# Patient Record
Sex: FEMALE | Race: WHITE | NOT HISPANIC OR LATINO | Employment: PART TIME | URBAN - METROPOLITAN AREA
[De-identification: names, ages, dates, MRNs, and addresses within clinical notes are randomized per-mention and may not be internally consistent; named-entity substitution may affect disease eponyms.]

---

## 2017-08-04 ENCOUNTER — OFFICE VISIT - HEALTHEAST (OUTPATIENT)
Dept: FAMILY MEDICINE | Facility: CLINIC | Age: 22
End: 2017-08-04

## 2017-08-04 DIAGNOSIS — H66.92 LEFT OTITIS MEDIA: ICD-10-CM

## 2017-08-04 DIAGNOSIS — H60.93 OTITIS EXTERNA OF BOTH EARS: ICD-10-CM

## 2017-12-07 ENCOUNTER — OFFICE VISIT - HEALTHEAST (OUTPATIENT)
Dept: PEDIATRICS | Facility: CLINIC | Age: 22
End: 2017-12-07

## 2017-12-07 DIAGNOSIS — Z00.01 ENCOUNTER FOR ROUTINE ADULT HEALTH EXAMINATION WITH ABNORMAL FINDINGS: ICD-10-CM

## 2017-12-07 DIAGNOSIS — Z51.81 MEDICATION MONITORING ENCOUNTER: ICD-10-CM

## 2017-12-07 ASSESSMENT — MIFFLIN-ST. JEOR: SCORE: 1168.42

## 2018-01-05 ENCOUNTER — RECORDS - HEALTHEAST (OUTPATIENT)
Dept: ADMINISTRATIVE | Facility: OTHER | Age: 23
End: 2018-01-05

## 2018-01-11 ENCOUNTER — RECORDS - HEALTHEAST (OUTPATIENT)
Dept: ADMINISTRATIVE | Facility: OTHER | Age: 23
End: 2018-01-11

## 2018-05-24 ENCOUNTER — RECORDS - HEALTHEAST (OUTPATIENT)
Dept: ADMINISTRATIVE | Facility: OTHER | Age: 23
End: 2018-05-24

## 2018-07-19 ENCOUNTER — OFFICE VISIT - HEALTHEAST (OUTPATIENT)
Dept: PEDIATRICS | Facility: CLINIC | Age: 23
End: 2018-07-19

## 2018-07-19 DIAGNOSIS — H60.391 INFECTIVE OTITIS EXTERNA, RIGHT: ICD-10-CM

## 2018-08-23 ENCOUNTER — RECORDS - HEALTHEAST (OUTPATIENT)
Dept: ADMINISTRATIVE | Facility: OTHER | Age: 23
End: 2018-08-23

## 2018-12-20 ENCOUNTER — OFFICE VISIT - HEALTHEAST (OUTPATIENT)
Dept: PEDIATRICS | Facility: CLINIC | Age: 23
End: 2018-12-20

## 2018-12-20 DIAGNOSIS — R94.120 FAILED HEARING SCREENING: ICD-10-CM

## 2018-12-20 DIAGNOSIS — Z00.00 ENCOUNTER FOR ANNUAL HEALTH EXAMINATION: ICD-10-CM

## 2018-12-20 ASSESSMENT — MIFFLIN-ST. JEOR: SCORE: 1258.92

## 2019-02-21 ENCOUNTER — RECORDS - HEALTHEAST (OUTPATIENT)
Dept: ADMINISTRATIVE | Facility: OTHER | Age: 24
End: 2019-02-21

## 2019-11-26 ENCOUNTER — RECORDS - HEALTHEAST (OUTPATIENT)
Dept: ADMINISTRATIVE | Facility: OTHER | Age: 24
End: 2019-11-26

## 2020-01-06 ENCOUNTER — OFFICE VISIT - HEALTHEAST (OUTPATIENT)
Dept: FAMILY MEDICINE | Facility: CLINIC | Age: 25
End: 2020-01-06

## 2020-01-06 DIAGNOSIS — Z11.1 SCREENING EXAMINATION FOR PULMONARY TUBERCULOSIS: ICD-10-CM

## 2020-01-06 DIAGNOSIS — Z00.00 WELL FEMALE EXAM WITHOUT GYNECOLOGICAL EXAM: ICD-10-CM

## 2020-01-06 LAB
ALBUMIN SERPL-MCNC: 4.9 G/DL (ref 3.5–5)
ALBUMIN UR-MCNC: ABNORMAL MG/DL
ALP SERPL-CCNC: 67 U/L (ref 45–120)
ALT SERPL W P-5'-P-CCNC: 41 U/L (ref 0–45)
ANION GAP SERPL CALCULATED.3IONS-SCNC: 18 MMOL/L (ref 5–18)
APPEARANCE UR: CLEAR
AST SERPL W P-5'-P-CCNC: 28 U/L (ref 0–40)
BACTERIA #/AREA URNS HPF: ABNORMAL HPF
BILIRUB SERPL-MCNC: 0.4 MG/DL (ref 0–1)
BILIRUB UR QL STRIP: ABNORMAL
BUN SERPL-MCNC: 13 MG/DL (ref 8–22)
CALCIUM SERPL-MCNC: 10.1 MG/DL (ref 8.5–10.5)
CHLORIDE BLD-SCNC: 103 MMOL/L (ref 98–107)
CHOLEST SERPL-MCNC: 163 MG/DL
CO2 SERPL-SCNC: 20 MMOL/L (ref 22–31)
COLOR UR AUTO: YELLOW
CREAT SERPL-MCNC: 0.79 MG/DL (ref 0.6–1.1)
ERYTHROCYTE [DISTWIDTH] IN BLOOD BY AUTOMATED COUNT: 11.4 % (ref 11–14.5)
FASTING STATUS PATIENT QL REPORTED: YES
GFR SERPL CREATININE-BSD FRML MDRD: >60 ML/MIN/1.73M2
GLUCOSE BLD-MCNC: 67 MG/DL (ref 70–125)
GLUCOSE UR STRIP-MCNC: NEGATIVE MG/DL
HBA1C MFR BLD: 4.7 % (ref 3.5–6)
HCT VFR BLD AUTO: 42.4 % (ref 35–47)
HDLC SERPL-MCNC: 47 MG/DL
HGB BLD-MCNC: 13.9 G/DL (ref 12–16)
HGB UR QL STRIP: NEGATIVE
KETONES UR STRIP-MCNC: ABNORMAL MG/DL
LDLC SERPL CALC-MCNC: 88 MG/DL
LEUKOCYTE ESTERASE UR QL STRIP: ABNORMAL
MCH RBC QN AUTO: 29.9 PG (ref 27–34)
MCHC RBC AUTO-ENTMCNC: 32.8 G/DL (ref 32–36)
MCV RBC AUTO: 91 FL (ref 80–100)
NITRATE UR QL: NEGATIVE
PH UR STRIP: 5.5 [PH] (ref 5–8)
PLATELET # BLD AUTO: 222 THOU/UL (ref 140–440)
PMV BLD AUTO: 9.1 FL (ref 7–10)
POTASSIUM BLD-SCNC: 4.8 MMOL/L (ref 3.5–5)
PROT SERPL-MCNC: 8.2 G/DL (ref 6–8)
RBC # BLD AUTO: 4.65 MILL/UL (ref 3.8–5.4)
RBC #/AREA URNS AUTO: ABNORMAL HPF
SODIUM SERPL-SCNC: 141 MMOL/L (ref 136–145)
SP GR UR STRIP: >=1.03 (ref 1–1.03)
SQUAMOUS #/AREA URNS AUTO: ABNORMAL LPF
TRIGL SERPL-MCNC: 139 MG/DL
TSH SERPL DL<=0.005 MIU/L-ACNC: 1.19 UIU/ML (ref 0.3–5)
UROBILINOGEN UR STRIP-ACNC: ABNORMAL
WBC #/AREA URNS AUTO: ABNORMAL HPF
WBC: 4.4 THOU/UL (ref 4–11)

## 2020-01-06 ASSESSMENT — MIFFLIN-ST. JEOR: SCORE: 1243.5

## 2020-01-07 ENCOUNTER — COMMUNICATION - HEALTHEAST (OUTPATIENT)
Dept: FAMILY MEDICINE | Facility: CLINIC | Age: 25
End: 2020-01-07

## 2020-01-07 LAB — BACTERIA SPEC CULT: NORMAL

## 2020-01-08 LAB
GAMMA INTERFERON BACKGROUND BLD IA-ACNC: 0.07 IU/ML
M TB IFN-G BLD-IMP: NEGATIVE
MITOGEN IGNF BCKGRD COR BLD-ACNC: 0 IU/ML
MITOGEN IGNF BCKGRD COR BLD-ACNC: 0.02 IU/ML
QTF INTERPRETATION: NORMAL
QTF MITOGEN - NIL: 1.54 IU/ML

## 2020-01-09 ENCOUNTER — COMMUNICATION - HEALTHEAST (OUTPATIENT)
Dept: FAMILY MEDICINE | Facility: CLINIC | Age: 25
End: 2020-01-09

## 2021-01-11 ENCOUNTER — OFFICE VISIT - HEALTHEAST (OUTPATIENT)
Dept: FAMILY MEDICINE | Facility: CLINIC | Age: 26
End: 2021-01-11

## 2021-01-11 DIAGNOSIS — Z00.00 ENCOUNTER FOR GENERAL ADULT MEDICAL EXAMINATION WITHOUT ABNORMAL FINDINGS: ICD-10-CM

## 2021-01-11 DIAGNOSIS — R62.0 DELAYED MILESTONES: ICD-10-CM

## 2021-01-11 DIAGNOSIS — Z12.4 ENCOUNTER FOR SCREENING FOR CERVICAL CANCER: ICD-10-CM

## 2021-01-11 LAB
ALBUMIN SERPL-MCNC: 4.4 G/DL (ref 3.5–5)
ALP SERPL-CCNC: 65 U/L (ref 45–120)
ALT SERPL W P-5'-P-CCNC: 15 U/L (ref 0–45)
ANION GAP SERPL CALCULATED.3IONS-SCNC: 11 MMOL/L (ref 5–18)
AST SERPL W P-5'-P-CCNC: 18 U/L (ref 0–40)
BASOPHILS # BLD AUTO: 0 THOU/UL (ref 0–0.2)
BASOPHILS NFR BLD AUTO: 1 % (ref 0–2)
BILIRUB SERPL-MCNC: 0.2 MG/DL (ref 0–1)
BUN SERPL-MCNC: 10 MG/DL (ref 8–22)
CALCIUM SERPL-MCNC: 9.8 MG/DL (ref 8.5–10.5)
CHLORIDE BLD-SCNC: 104 MMOL/L (ref 98–107)
CO2 SERPL-SCNC: 25 MMOL/L (ref 22–31)
CREAT SERPL-MCNC: 0.7 MG/DL (ref 0.6–1.1)
EOSINOPHIL # BLD AUTO: 0.1 THOU/UL (ref 0–0.4)
EOSINOPHIL NFR BLD AUTO: 1 % (ref 0–6)
ERYTHROCYTE [DISTWIDTH] IN BLOOD BY AUTOMATED COUNT: 12.2 % (ref 11–14.5)
GFR SERPL CREATININE-BSD FRML MDRD: >60 ML/MIN/1.73M2
GLUCOSE BLD-MCNC: 85 MG/DL (ref 70–125)
HCT VFR BLD AUTO: 40.5 % (ref 35–47)
HGB BLD-MCNC: 13.1 G/DL (ref 12–16)
IMM GRANULOCYTES # BLD: 0 THOU/UL
IMM GRANULOCYTES NFR BLD: 0 %
LYMPHOCYTES # BLD AUTO: 1.5 THOU/UL (ref 0.8–4.4)
LYMPHOCYTES NFR BLD AUTO: 32 % (ref 20–40)
MCH RBC QN AUTO: 29.6 PG (ref 27–34)
MCHC RBC AUTO-ENTMCNC: 32.3 G/DL (ref 32–36)
MCV RBC AUTO: 91 FL (ref 80–100)
MONOCYTES # BLD AUTO: 0.4 THOU/UL (ref 0–0.9)
MONOCYTES NFR BLD AUTO: 8 % (ref 2–10)
NEUTROPHILS # BLD AUTO: 2.7 THOU/UL (ref 2–7.7)
NEUTROPHILS NFR BLD AUTO: 58 % (ref 50–70)
PLATELET # BLD AUTO: 286 THOU/UL (ref 140–440)
PMV BLD AUTO: 11.4 FL (ref 8.5–12.5)
POTASSIUM BLD-SCNC: 4.2 MMOL/L (ref 3.5–5)
PROT SERPL-MCNC: 7.5 G/DL (ref 6–8)
RBC # BLD AUTO: 4.43 MILL/UL (ref 3.8–5.4)
SODIUM SERPL-SCNC: 140 MMOL/L (ref 136–145)
WBC: 4.7 THOU/UL (ref 4–11)

## 2021-01-11 ASSESSMENT — MIFFLIN-ST. JEOR: SCORE: 1361.43

## 2021-01-12 LAB — 25(OH)D3 SERPL-MCNC: 23.9 NG/ML (ref 30–80)

## 2021-01-13 ENCOUNTER — COMMUNICATION - HEALTHEAST (OUTPATIENT)
Dept: PEDIATRICS | Facility: CLINIC | Age: 26
End: 2021-01-13

## 2021-05-31 VITALS — BODY MASS INDEX: 22.19 KG/M2 | WEIGHT: 110.8 LBS

## 2021-05-31 VITALS — HEIGHT: 60 IN | BODY MASS INDEX: 21.64 KG/M2 | WEIGHT: 110.2 LBS

## 2021-06-01 VITALS — WEIGHT: 115.8 LBS | BODY MASS INDEX: 23 KG/M2

## 2021-06-02 VITALS — HEIGHT: 60 IN | BODY MASS INDEX: 25.21 KG/M2 | WEIGHT: 128.4 LBS

## 2021-06-04 VITALS
DIASTOLIC BLOOD PRESSURE: 58 MMHG | WEIGHT: 125 LBS | HEIGHT: 60 IN | BODY MASS INDEX: 24.54 KG/M2 | SYSTOLIC BLOOD PRESSURE: 100 MMHG

## 2021-06-05 VITALS
DIASTOLIC BLOOD PRESSURE: 60 MMHG | WEIGHT: 151 LBS | OXYGEN SATURATION: 100 % | BODY MASS INDEX: 29.64 KG/M2 | HEIGHT: 60 IN | HEART RATE: 82 BPM | SYSTOLIC BLOOD PRESSURE: 108 MMHG

## 2021-06-05 NOTE — TELEPHONE ENCOUNTER
LM to patient's mother Iman regarding results, informed that results are at the  ready to get picked up.

## 2021-06-05 NOTE — TELEPHONE ENCOUNTER
----- Message from Jaye Hall PA-C sent at 1/9/2020  7:32 AM CST -----  TB testing is negative, send copies of all labs to mother for her program. please call results to the patient or send a letter if not reachable by phone.

## 2021-06-05 NOTE — PROGRESS NOTES
FEMALE PREVENTATIVE EXAM    Assessment and Plan:       1. Well female exam without gynecological exam      - QTF-Mycobacterium tuberculosis by [facility]-TB Gold Plus; Future  - Urinalysis-UC if Indicated; Future  - Glycosylated Hemoglobin A1c; Future  - Lipid [place]  - Thyroid [place]  - HM2(CBC w/o Differential)  - Comprehensive Metabolic Panel  - Glycosylated Hemoglobin A1c  - Urinalysis-UC if Indicated  - Culture, Urine    2. Screening examination for pulmonary tuberculosis    - QTF-Mycobacterium tuberculosis by [facility]ON-TB Gold Plus; Future  - QTF-Mycobacterium tuberculosis by QuantiFERON-TB Gold Plus     Next follow up:  Return in about 1 year (around 1/6/2021) for With PCP, Annual physical.    Immunization Review  Adult Imm Review: No immunizations due today  non-smoker    I discussed the following with the patient:   Adult Healthy Living: Importance of regular exercise  Healthy nutrition  STI prevention  Contraception options  Supplement use        Subjective:   Chief Complaint: Araceli Go is an 24 y.o. female here for a preventative health visit.     HPI:  Araceli Go is seen for new patient appointment, physical today.  She will be going to a  program for adults with special needs.  She needs physical forms filled out for this program.  She will be doing activities but no sports.  She recently started a job in a restaurant, she is clearing tables of dishes.  She is not sexually active although she has had a boyfriend in the past.  Her adoptive mother would like to discuss contraception options for her.  ROS: Patient denies fever, chills, sweats, fainting, fatigue, weight change, dizziness, sleep problems, chest pain, palpitations, shortness of breath, wheezing, cough,  sore throat, changes in hearing, ear pain,tinnitus,  disphagia, sore throat, globus, changes in vision, eye pain eye redness, acid reflux, nausea, vomiting, diarrhea, constipation, black or bloody stools,   Dysuria, frequency, urinary incontinence, nocturia, hematuria, back pain,joint pain, bone pain, muscle cramps,edema, weakness, numbness, tingling of extremities, rash, itching, skin changes, swollen lymph nodes, thirst, increased urination, breast lumps, breast pain, nipple discharge, memory difficulties, anxiety, mood swings, (female)vaginal discharge, dyspareunia, menorrhagia, pelvic pain, sexual dysfunction,   (male) testicular lumps, erectile dysfunction.      Healthy Habits  Are you taking a daily aspirin? No  Do you typically exercising at least 40 min, 3-4 times per week?  NO  Do you usually eat at least 4 servings of fruit and vegetables a day, include whole grains and fiber and avoid regularly eating high fat foods? NO  Have you had an eye exam in the past two years? Yes  Do you see a dentist twice per year? Yes  Do you have any concerns regarding sleep? No    Safety Screen  If you own firearms, are they secured in a locked gun cabinet or with trigger locks? The patient does not own any firearms  No data recorded    Review of Systems:  Please see above.  The rest of the review of systems are negative for all systems.     Pap History:   No Pap smears yet, not sexually active.  Cancer Screening       Status Date      PAP SMEAR Overdue 9/4/2018           Patient Care Team:  Susu Perry MD as PCP - General  Susu Perry MD as Assigned PCP        History     Reviewed By Date/Time Sections Reviewed    Edilma Braswell 1/6/2020  1:18 PM Tobacco            Objective:   Vital Signs:   Visit Vitals  /58 (Patient Site: Right Arm, Patient Position: Sitting, Cuff Size: Adult Regular)   Ht 5' (1.524 m)   Wt 125 lb (56.7 kg)   BMI 24.41 kg/m           PHYSICAL EXAM  Alert, cooperative, well-hydrated.  Appears well.  Eyes: Pupils equal, round, reactive to light.  HEENT: Sclera white, nares patent, MMM, TMs pearly bilaterally, thyroid without organomegaly, no lymphadenopathy.  Lungs: Clear to auscultation. No  retractions, no increased work of respiration, equal chest rise.   Heart: Regular rate and rhythm, no murmurs, clicks,    Gallops.  Abdomen: Soft, bowel sounds in 4 quadrants with no tenderness to palpation, no organomegaly or masses, no aortic or renal bruits.  Extremities: no tenderness to palpation of gastrocnemius, bilaterally.  Skin: no increased warmth, edema, or erythema of lower legs bilaterally.  Back:  No cervical, thoracic or lumbar tenderness to spinous processes or musculature.    Neuro: pupils equal and reactive to light bilaterally, CN II - XII  intact. No focal motor/sensory deficits.Rhomberg negative. M/S 5/5 all extremities. DTR 2/4 all extremities             Medication List      as of January 6, 2020 11:59 PM     You have not been prescribed any medications.         Additional Screenings Completed Today:

## 2021-06-05 NOTE — TELEPHONE ENCOUNTER
----- Message from Jaye Hall PA-C sent at 1/7/2020  7:14 AM CST -----  Labs are normal, glucose was low which fits with her long fast and her ketones in the urine. She would benefit from small meals more often, snacks such as protein bars mid day and in afternoon to keep glucose level steady.  please call results to the patient or send a letter if not reachable by phone.

## 2021-06-05 NOTE — PROGRESS NOTES
TB testing is negative, send copies of all labs to mother for her program. please call results to the patient or send a letter if not reachable by phone.

## 2021-06-05 NOTE — PROGRESS NOTES
Ketones in urine most likely due to not eating and burning fats, culture is pending and will call if UTI. please call results to the patient or send a letter if not reachable by phone.

## 2021-06-05 NOTE — PROGRESS NOTES
Labs are normal, glucose was low which fits with her long fast and her ketones in the urine. She would benefit from small meals more often, snacks such as protein bars mid day and in afternoon to keep glucose level steady.  please call results to the patient or send a letter if not reachable by phone.

## 2021-06-12 NOTE — PROGRESS NOTES
Subjective:      Araceli Go is a 21 y.o. female here with mom for evaluation of left ear discharge x 1 day. Drainage was clear, but had some greenish tint as well. Pain in the left ear, throbbing, no changes in hearing. Mom tried some debrox to see if maybe she had wax build-up. Takes tub baths and likes to submerge her head for long periods of time, not much swimming.   Denies any fevers, sore throat, no N/V/D. No accompanying URI symptom.      Objective:     Vitals:    08/04/17 1814   BP: 102/60   Pulse: 97   Resp: 16   Temp: 98.3  F (36.8  C)   SpO2: 98%       General: Alert, interactive,  NAD, cooperative on exam  Eyes: PERRLA, EOMI, conjunctivae clear.   Ears: Right TM; partially visible was pink and translucent. Moderate swelling and exudate in the outer ear canal. Left TM; TM not visible, purulent drainage noted. Outer ear canal almost completely swollen with exudate. No swelling, redness of the left auricle, mild discomfort with palpation of the left tragus. No mastoid tenderness.   Nose:  Nasal mucosa pink and moist.   Mouth/Throat:  Tonsils and Posterior pharynx clear.  Mucus membranes pink and moist, free of lesions.  Neck: Supple, symmetrical, trachea midline, no adenopathy;   Lungs: CTA bilaterally, good air movement throughout. No rales, rhonchi or wheezing  Heart:: Regular rate and rhythm, S1, S2 normal, no murmur, click, rub or gallop    Assessment/Plan:      1. Otitis externa of both ears  - ciprofloxacin-dexamethasone (CIPRODEX) otic suspension; Administer 4 drops into both ears 2 (two) times a day for 7 days. 4 drops in affected ear twice daily x 7 days  Dispense: 7.5 mL; Refill: 0    2. Left otitis media  - amoxicillin (AMOXIL) 400 mg/5 mL suspension; Take 11 mL (875 mg total) by mouth 2 (two) times a day for 10 days.  Dispense: 220 mL; Refill: 0     I reviewed exam findings with mom and patient. Prescribed ciprodex ear drops for otitis externa and also Amoxicillin for suspected left  otitis media, both of which were discussed with mom and patient. Proper instillation of ear drops demonstrated. Ibuprofen or acetaminophen for comfort, proper dosing discussed. Advised no swimming or submersion under water until symptoms have entirely resolved and treatment is completed. Follow-up with PCP or return to walk-in in 2 days if symptoms worsening, fever/chills, flu-like symptoms, or mastoid tenderness. Mom and patient verbalized understanding and agrees with plan of care.     -Patient instructions given.

## 2021-06-14 NOTE — PROGRESS NOTES
FEMALE PREVENTATIVE EXAM    Assessment and Plan:     Patient has been advised of split billing requirements and indicates understanding: Yes    1. Encounter for general adult medical examination without abnormal findings  Routine labs, flu shot given.  Filled out paperwork for special olympics  - HM1(CBC and Differential)  - Comprehensive Metabolic Panel  - Vitamin D, Total (25-Hydroxy)    2. Encounter for screening for cervical cancer   Unable to tolerate the speculum exam and wished for the exam to stop.  Will try for next year.  Discussed safe sex practices.    3.  Delayed developmental milestones - stable.          Next follow up:  Return in about 1 year (around 1/11/2022) for Annual physical.    Immunization Review  Adult Imm Review: Due today, orders placed    I discussed the following with the patient:   Adult Healthy Living: Importance of regular exercise  Healthy nutrition  Weight loss referral options  Getting adequate sleep  Stress management    I have had an Advance Directives discussion with the patient.    Subjective:   Chief Complaint: Araceli Go is an 25 y.o. female here for a preventative health visit.    Patient has been advised of split billing requirements and indicates understanding: Yes  HPI:  Eating well.  Sleeping well.  Getting some exercise.  In a relationship with a boy last year and some physical experimentation but unknown if consummate the relationship.  Never had a pap and willing to try to have one today.    Healthy Habits  Are you taking a daily aspirin? No  Do you typically exercising at least 40 min, 3-4 times per week?  Yes  Do you usually eat at least 4 servings of fruit and vegetables a day, include whole grains and fiber and avoid regularly eating high fat foods? NO  Have you had an eye exam in the past two years? Yes  Do you see a dentist twice per year? Yes  Do you have any concerns regarding sleep? No    Safety Screen  If you own firearms, are they secured in a  locked gun cabinet or with trigger locks? The patient does not own any firearms  Do you feel you are safe where you are living?: Yes (1/11/2021  3:36 PM)  Do you feel you are safe in your relationship(s)?: Yes (1/11/2021  3:36 PM)      Review of Systems:  Please see above.  The rest of the review of systems are negative for all systems.     Pap History:   Last 3 PAP results:  No results found for: PAP  Cancer Screening       Status Date      PAP SMEAR Overdue 9/4/2018           Patient Care Team:  Susu Perry MD as PCP - General  Jaye Hall PA-C as Assigned PCP        History     Reviewed By Date/Time Sections Reviewed    Caroline Paulino CMA 1/11/2021  3:40 PM Tobacco            Objective:   Vital Signs:   Visit Vitals  /60 (Patient Site: Right Arm, Patient Position: Sitting, Cuff Size: Adult Regular)   Pulse 82   Ht 5' (1.524 m)   Wt 151 lb (68.5 kg)   LMP 01/05/2021   SpO2 100%   BMI 29.49 kg/m           PHYSICAL EXAM    Physical Exam:  General Appearance: Alert, cooperative, no distress, appears stated age   Head: Normocephalic, without obvious abnormality, atraumatic  Eyes: PERRL, conjunctiva/corneas clear, EOM's intact   Ears: Normal TM's and external ear canals, both ears  Nose:Nares normal, septum midline,mucosa normal, no drainage    Throat:Lips, mucosa, and tongue normal; teeth and gums normal  Neck: Supple, symmetrical, trachea midline, no adenopathy;  thyroid: not enlarged, symmetric, no tenderness/mass/nodules  Back: Symmetric, no curvature, ROM normal,  Lungs: Clear to auscultation bilaterally, respirations unlabored  Heart: Regular rate and rhythm, S1 and S2 normal, no murmur, rub, or gallop  Abdomen: Soft, non-tender, bowel sounds active all four quadrants,  no masses, no organomegaly  Extremities: Extremities normal, atraumatic, no cyanosis or edema  Skin: Skin color, texture, turgor normal, no rashes or lesions  Lymph nodes: Cervical, supraclavicular, and axillary nodes  normal  Neurologic: Normal  :  Scant white discharge, normal vagina                   Medication List      as of January 11, 2021  4:24 PM     You have not been prescribed any medications.         Additional Screenings Completed Today:     The patient was counseled and encouraged to consider modifying their diet and eating habits. She was provided with information on recommended healthy diet options.

## 2021-06-14 NOTE — PROGRESS NOTES
[facility] Well Child Check    ASSESSMENT & PLAN  Araceli Go is a 22 y.o. who has normal growth and normal development.    Diagnoses and all orders for this visit:    Encounter for routine adult health examination with abnormal findings  -     Influenza, Seasonal Quad, Preservative Free 36+ Months (syringe)  -     Hearing Screening  -     Hemoglobin        Return to clinic in 1 year for a Well Child Check or sooner as needed    IMMUNIZATIONS/LABS  Immunizations were reviewed and orders were placed as appropriate. and I have discussed the risks and benefits of all of the vaccine components with the patient/parents.  All questions have been answered. No history of vaccine reactions.    REFERRALS  Dental:  Recommend routine dental care as appropriate., The patient has already established care with a dentist.  Other:  No additional referrals were made at this time.    ANTICIPATORY GUIDANCE  I have reviewed age appropriate anticipatory guidance.  Social:  Friends, Extracurricular Activities and Changes and Choices  Parenting:  [place]/Dependence and Confidential Health Care  Nutrition:  Junk Food and Body Image  Play and Communication:  Organized Sports, Appropriate Use of TV and Read Books  Health:  Drugs, Smoking, Alcohol, Activity (>45 min/day), Sleep, Sun Screen and Dental Care  Safety:  Seat Belts, Swimming Safety, Bike/Motorcycle Helmets and Outdoor Safety Avoiding Sun Exposure  Sexuality:  Safe Sex and Contraception    HEALTH HISTORY  Do you have any concerns that you'd like to discuss today?:     Surgery for strabismus    Contraception: She says she is not ready for contraception or to have sex. She has a boyfriend whose name is Nithin. They have been dating for 3 years. She met him at [email or web address]. She had a conversation with her mother just this morning regarding sex. She stated that she does not feel ready at this time. The conversation occurs just about every time he comes over.     Low  "Blood Count: One year ago she wanted to give blood but she was unable to do so because her hemoglobin was too low. Her energy level has been normal. Her last hemoglobin was checked in clinic here on 1/15/15 and it was 13.1 at that time.     Weight Loss: She is down roughly 14 lbs since last year. The weight loss is unintentional. Her mother mentions that she tends to eat out of boredom and she has been busier over the past year. She has been eating less now than she was one year ago. She denies ongoing stomachaches, diarrhea, constipation, rashes, heart racing, sweating, or swollen joints. She has a bowel movement almost every day. She has not noticed any blood in the stool. She does not eat breakfast, but this is typical behavior for her. She does not skip lunches or dinners. She has a BM daily. Mom is not concerned about disordered eating.    ROS  She denies any SOB, headaches, or syncope. She sees ophthalmology annually. Her periods are sometimes irregular, but otherwise normal. She has cramps monthly with her period and typically has a  couch day\" on the day it begins. She will take liquid Tylenol as needed.   All other systems negative.    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 She denies shortness of breath, chest pain, and syncope.  She started working at [employer] this week.     Roomed by: idalmis Dalal CMA    Accompanied by Mother    Refills needed? No    Do you have any forms that need to be filled out? No        Do you have any significant health concerns in your family history?: No  Family History   Problem Relation Age of Onset     Adopted: Yes     Family history unknown: Yes     Since your last visit, have there been any major changes in your family, such as a move, job change, separation, divorce, or death in the family?: No  Has a lack of transportation kept you from medical appointments?: No    Home  Who lives in your home?:  Mom, sister, 3 cats  Social History "     Social History Narrative     Do you have any concerns about losing your housing?: No  Is your housing safe and comfortable?: No  Do you have any trouble with sleep?:  No. She usually sleeps at 9 pm and wakes up at 7 am. She does not take naps.    Education  What school do you child attend?:  [employer]  What grade are you in?:  n/a  How do you perform in school (grades, behavior, attention, homework?: doing well     Eating  Do you eat regular meals including fruits and vegetables?:  no, not a breakfast eater  What are you drinking (cow's milk, water, soda, juice, sports drinks, energy drinks, etc)?: cow's milk- 1%, water and soda  Have you been worried that you don't have enough food?: No  Do you have concerns about your body or appearance?:  No  She will make soup, chicken tenders, salads, or a frozen dinner if she is home alone. She does not cook but she can operate the microwave. Sometimes there are leftovers as well. Her boyfriend Nithin likes to bring her a can of pop every day; she does not drink more than one per day. Otherwise she drinks milk and water.     Activities  Do you have friends?:  yes  Do you get at least one hour of physical activity per day?:  yes  How many hours a day are you in front of a screen other than for schoolwork (computer, TV, phone)?:  2  What do you do for exercise?:  Walking, bike, swim  Do you have interest/participate in community activities/volunteers/school sports?:  Yes- wants to do special Olympics. 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 She has been very excited! She was previously at [employer].     MENTAL HEALTH SCREENING  PHQ-2 Total Score: 0 (12/7/2017 11:00 AM)  No Data Recorded    VISION/HEARING  Vision: Patient is already followed by a vision specialist  Hearing:  Completed. See Results     Hearing Screening    125Hz 250Hz 500Hz 1000Hz 2000Hz 3000Hz 4000Hz 6000Hz 8000Hz   Right ear:   20 20 20  20     Left ear:   20 20 20  20         TB Risk  "Assessment:  The patient and/or parent/guardian answer positive to:  patient and/or parent/guardian answer 'no' to all screening TB questions    Dyslipidemia Risk Screening  Have either of your parents or any of your grandparents had a stroke or heart attack before age 55?: No  Any parents with high cholesterol or currently taking medications to treat?: No     Dental  When was the last time you saw the dentist?: 3-6 months ago   Flouride Varnish Application Screening  Is child seen by dentist?     Yes    Patient Active Problem List   Diagnosis     Myopia     Exotropia     Delayed Developmental Milestones     Short Stature (On Exam)       Drugs  Does the patient use tobacco/alcohol/drugs?:  no    Safety  Does the patient have any safety concerns (peer or home)?:  no  Does the patient use safety belts, helmets and other safety equipment?:  yes    Sex  Have you ever had sex?:  No    MEASUREMENTS  Height:  4' 11.5\" (1.511 m)  Weight: 110 lb 3.2 oz (50 kg)  BMI: Body mass index is 21.89 kg/(m^2).  Blood Pressure: 108/62  Growth percentile SmartLinks can only be used for patients less than 20 years old.    PHYSICAL EXAM  Constitutional: She appears well-developed and well-nourished. Cognitive disability; able to answer questions appropriately  HEENT: Head: Normocephalic.    Right Ear: Tympanic membrane, external ear and canal normal.    Left Ear: Tympanic membrane, external ear and canal normal.    Nose: Nose normal.    Mouth/Throat: Mucous membranes are moist. Oropharynx is clear.    Eyes: Conjunctivae and lids are normal. Pupils are equal, round, and reactive to light.   Neck: Neck supple. No tenderness is present.   Cardiovascular: Normal rate and regular rhythm. No murmur heard.  Pulmonary/Chest: Effort normal and breath sounds normal. There is normal air entry. Breast development is normal.  Joshua stage 5.   Abdominal: Soft. There is no hepatosplenomegaly. No inguinal hernia.   Musculoskeletal: Normal range of " motion. Normal strength and tone. No abnormalities. Spine is straight. Normal duck walk.  Normal heel to toe walk.   : Normal external female genitalia.  Joshua stage 5.   Neurological: She is alert. Reflexes are 3+. She has poor balance standing on each leg and unable to do tandem walk.  Psychiatric: She has a normal mood and affect. Her speech is normal and behavior is normal.  Skin: Clear. No rashes.     ADDITIONAL HISTORY SUMMARIZED (2): None.  DECISION TO OBTAIN EXTRA INFORMATION (1): None.   RADIOLOGY TESTS (1): None.  LABS (1): Ordered labs.  MEDICINE TESTS (1): None.  INDEPENDENT REVIEW (2 each): None.     The visit lasted a total of 18 minutes face to face with the patient. Over 50% of the time was spent counseling and educating the patient about nutrition and development.    I, Asha Riley, am scribing for and in the presence of, Dr. Perry.    I, Dr. Susu Perry, personally performed the services described in this documentation, as scribed by Asha Riley in my presence, and it is both accurate and complete.    Total Data Points: 1

## 2021-06-14 NOTE — PROGRESS NOTES
Karen Go,    Your result is/are stable.  Your vitamin d is slightly low and recommend over the counter vitamin d once a day.    Nessa Harley MD

## 2021-06-18 NOTE — PATIENT INSTRUCTIONS - HE
Patient Instructions by Nessa Harley MD at 1/11/2021  3:30 PM     Author: Nessa Harley MD Service: -- Author Type: Physician    Filed: 1/11/2021  3:49 PM Encounter Date: 1/11/2021 Status: Signed    : Nessa Harley MD (Physician)           Preventing Skin Cancer     Use sunscreen of SPF 30 or greater. Apply liberally.   Relaxing in the sun may feel good. But it isnt good for your skin. In fact, the suns harmful rays are the major cause of skin cancer. This is a serious disease that can be life-threatening. People of all ages, races, and backgrounds are at risk.  Skin cancer is the most common cancer in the [place] But in most cases, it can be prevented.  Your role in prevention  You can act today to help prevent skin cancer. Start by avoiding the suns UV (ultraviolet) rays. And dont use tanning beds or lamps. They are no safer than the sun. Taking these steps can help keep you from getting skin cancer. It can also help prevent wrinkles and other aging effects caused by the sun. Make sure your children also follow these safeguards. Now is the time to start taking steps to prevent skin cancer.  When you are outdoors  Protect your skin when you go out during the day. Take safety steps whenever you go out to eat, run errands by car or on foot, or do any outdoor activity. There isnt just one easy way to protect your skin. Its best to follow all of these steps:    Wear tightly woven clothing that covers your skin. Put on a wide-brimmed hat to protect your face, ears, and scalp.    Watch the clock. Try to stay out of the sun between 10 a.m. and 4 p.m. That's when the sun's rays are strongest.    Head for the shade or create your own. Use an umbrella when sitting or strolling.    Know that the suns rays can reflect off sand, water, and snow. This can harm your skin. Take extra care when you are near reflective surfaces.    Keep in mind that even when the weather is hazy or cloudy, your skin can be exposed to  "strong UV rays.    Shield your skin with sunscreen. Also use sunscreen on your childrens skin. Keep babies younger than 6 months old out of the sun.  Tips for using sunscreen  To help prevent skin cancer, choose the right sunscreen and use it correctly. Try these tips:    Choose a sunscreen that has an SPF (sun protection factor) of at least 30. Also choose a sunscreen labeled \"broad spectrum. This will protect you from both UVA and UVB (ultraviolet A and B) rays.    If one brand irritates your skin, try another, such as one without fragrance.    Use a water-resistant sunscreen if you swim or sweat.    Use at least 1 ounce of sunscreen to cover exposed areas. This is enough to fill a shot glass. You might need to adjust the amount depending on your body size.    Put the sunscreen on dry skin about 15 minutes before going outdoors. This gives it time to soak in.    Reapply sunscreen every 2 hours. If youre active, do this more often.    Cover any sun-exposed skin, from your face to your feet. Dont forget your scalp, ears, and lips.    Know that while sunscreen helps protect you, it isnt enough. Sunscreens extend the length of time you can be outdoors before your skin starts to get red. But they don't give you total protection. Using sunscreen doesn't mean you can stay out in the sun for an unlimited time. Your skin cells are still being damaged. You should also wear protective clothing. And try to stay out of the sun as much as you can, especially from 10 a.m. to 4 p.m.  Date Last Reviewed: 7/1/2019 2000-2019 [employer]. [address] [place] [address], [place] [address]. All rights reserved. This information is not intended as a substitute for professional medical care. Always follow your healthcare professional's instructions.        Patient Education   Understanding USDA MyPlate  The USDA (US Department of Agriculture) has guidelines to help you make healthy food choices. These are called MyPlate. MyPlate " shows the food groups that make up healthy meals using the image of a place setting. Before you eat, think about the healthiest choices for what to put onto your plate or into your cup or bowl. To learn more about building a healthy plate, visit www.choosemyplate.gov.       The Food Groups    Fruits: Any fruit or 100% fruit juice counts as part of the Fruit Group. Fruits may be fresh, canned, frozen, or dried, and may be whole, cut-up, or pureed. Make half your plate fruits and vegetables.    Vegetables: Any vegetable or 100% vegetable juice counts as a member of the Vegetable Group. Vegetables may be fresh, frozen, canned, or dried. They can be served raw or cooked and may be whole, cut-up, or mashed. Make half your plate fruits and vegetables.     Grains: All foods made from grains are part of the Grains Group. These include wheat, rice, oats, cornmeal, and barley such as bread, pasta, oatmeal, cereal, tortillas, and grits. Grains should be no more than a quarter of your plate. At least half of your grains should be whole grains.    Protein: This group includes meat, poultry, seafood, beans and peas, eggs, processed soy products (like tofu), nuts (including nut butters), and seeds. Make protein choices no more than a quarter of your plate. Meat and poultry choices should be lean or low fat.    Dairy: All fluid milk products and foods made from milk that contain calcium, like yogurt and cheese are part of the Dairy Group. (Foods that have little calcium, such as cream, butter, and cream cheese, are not part of the group.) Most dairy choices should be low-fat or fat-free.    Oils: These are fats that are liquid at room temperature. They include canola, corn, olive, soybean, and sunflower oil. Foods that are mainly oil include mayonnaise, certain salad dressings, and soft margarines. You should have only 5 to 7 teaspoons of oils a day. You probably already get this much from the food you eat.  Use [employer] to  Help Build Your Meals  The SuperTracker can help you plan and track your meals and activity. You can look up individual foods to see or compare their nutritional value. You can get guidelines for what and how much you should eat. You can compare your food choices. And you can assess personal physical activities and see ways you can improve. Go to www.SPIL GAMES.gov/supertracker/.    7452-6439 [employer]. [address] [place] [address], [place] [address]. All rights reserved. This information is not intended as a substitute for professional medical care. Always follow your healthcare professional's instructions.

## 2021-06-19 NOTE — PROGRESS NOTES
[facility] Pediatric Acute Visit     HPI:  Araceli Go is a 22 y.o.  female who presents to the clinic with one day ear pain,  No ear drainage, no fever   Has been swimming a lot , last summer had otitis externa , mom wondering if this is the case again, same symptoms   No V and  No D         Past Med / Surg History:  History reviewed. No pertinent past medical history.  History reviewed. No pertinent surgical history.    Fam / Soc History:  Family History   Problem Relation Age of Onset     Adopted: Yes     Family history unknown: Yes     Social History     Social History Narrative         ROS:  Gen: No fever or fatigue  Eyes: No eye discharge.   ENT: No nasal congestion or rhinorrhea. No pharyngitis. No otalgia.  Resp: No SOB, cough or wheezing.  GI:No diarrhea, nausea or vomiting  :No dysuria  MS: No joint/bone/muscle tenderness.  Skin: No rashes  Neuro: No headaches  Lymph/Hematologic: No gland swelling      Objective:  Vitals: /50 (Patient Site: Right Arm, Patient Position: Sitting, Cuff Size: Adult Regular)  Pulse 76  Temp 98.7  F (37.1  C) (Oral)   Wt 115 lb 12.8 oz (52.5 kg)  LMP 05/27/2018  SpO2 99%  BMI 23 kg/m2    Gen: Alert, well appearing  ENT: No nasal congestion or rhinorrhea. Oropharynx normal, moist mucosa.  TMs normal bilaterally.  Right ear canal moderate swelling and redness, mild debris, left canal without concern   Eyes: Conjunctivae clear bilaterally.   Heart: Regular rate and rhythm; normal S1 and S2; no murmurs, gallops, or rubs.  Lungs: Unlabored respirations; clear breath sounds.  Abdomen: Soft, without organomegaly. Bowel sounds normal. Nontender. No masses palpable. No distention.    Musculoskeletal: Joints with full range-of-motion. Normal upper and lower extremities.  Skin: Normal without lesions.  Neuro: Oriented. Normal reflexes; normal tone; no focal deficits appreciated. Appropriate for age.  Hematologic/Lymph/Immune: No cervical lymphadenopathy  Psychiatric:  Appropriate affect      Pertinent results / imaging:  Reviewed     Assessment and Plan:    Araceli Go is a 22 y.o. female with:    1. Infective otitis externa, right  Reviewed appropriate use Ciprodex, contagiousness and prevention           MUKESH Cook-BELA  Pediatric Mental Health Specialist   Certified Lactation Consultant   [facility]

## 2021-06-20 NOTE — LETTER
Letter by Jaye Hall PA-C at      Author: Jaye Hall PA-C Service: -- Author Type: --    Filed:  Encounter Date: 1/9/2020 Status: Signed         Araceli Go  [address] [facility] [address]             January 9, 2020         Dear Ms. Donavan,    Below are the results from your recent visit:    Resulted Orders   Lipid Cascade   Result Value Ref Range    Cholesterol 163 <=199 mg/dL    Triglycerides 139 <=149 mg/dL    HDL Cholesterol 47 (L) >=50 mg/dL    LDL Calculated 88 <=129 mg/dL    Patient Fasting > 8hrs? Yes    Thyroid Cascade   Result Value Ref Range    TSH 1.19 0.30 - 5.00 uIU/mL   QTF-Mycobacterium tuberculosis by QuantiFERON-TB Gold Plus   Result Value Ref Range    QTF RESULT Negative Negative    QTF INTREPRETATION       No interferon-gamma response to M. tuberculosis antigens was detected.  Infecton with M. tuberculosis is unlikely.  A negative result alone does not exclude infection with M. tuberculosis    QTF NIL 0.07 IU/mL    QTF ANTIGEN TB1-NIL 0.00 IU/mL    QTF ANTIGEN TB2 - NIL 0.02 IU/mL    QTF MITOGEN-NIL 1.54 IU/mL   HM2(CBC w/o Differential)   Result Value Ref Range    WBC 4.4 4.0 - 11.0 thou/uL    RBC 4.65 3.80 - 5.40 mill/uL    Hemoglobin 13.9 12.0 - 16.0 g/dL    Hematocrit 42.4 35.0 - 47.0 %    MCV 91 80 - 100 fL    MCH 29.9 27.0 - 34.0 pg    MCHC 32.8 32.0 - 36.0 g/dL    RDW 11.4 11.0 - 14.5 %    Platelets 222 140 - 440 thou/uL    MPV 9.1 7.0 - 10.0 fL   Comprehensive Metabolic Panel   Result Value Ref Range    Sodium 141 136 - 145 mmol/L    Potassium 4.8 3.5 - 5.0 mmol/L    Chloride 103 98 - 107 mmol/L    CO2 20 (L) 22 - 31 mmol/L    Anion Gap, Calculation 18 5 - 18 mmol/L    Glucose 67 (L) 70 - 125 mg/dL    BUN 13 8 - 22 mg/dL    Creatinine 0.79 0.60 - 1.10 mg/dL    GFR MDRD Af Amer >60 >60 mL/min/1.73m2    GFR MDRD Non Af Amer >60 >60 mL/min/1.73m2    Bilirubin, Total 0.4 0.0 - 1.0 mg/dL    Calcium 10.1 8.5 - 10.5 mg/dL    Protein, Total 8.2 (H) 6.0 - 8.0  g/dL    Albumin 4.9 3.5 - 5.0 g/dL    Alkaline Phosphatase 67 45 - 120 U/L    AST 28 0 - 40 U/L    ALT 41 0 - 45 U/L    Narrative    Fasting Glucose reference range is 70-99 mg/dL per  American Diabetes Association (ADA) guidelines.   Glycosylated Hemoglobin A1c   Result Value Ref Range    Hemoglobin A1c 4.7 3.5 - 6.0 %   Urinalysis-UC if Indicated   Result Value Ref Range    Color, UA Yellow Colorless, Yellow, Straw, Light Yellow    Clarity, UA Clear Clear    Glucose, UA Negative Negative    Bilirubin, UA Small (!) Negative    Ketones, UA >=160 mg/dL (!) Negative    Specific Gravity, UA >=1.030 1.005 - 1.030    Blood, UA Negative Negative    pH, UA 5.5 5.0 - 8.0    Protein, UA 30 mg/dL (!) Negative mg/dL    Urobilinogen, UA 0.2 E.U./dL 0.2 E.U./dL, 1.0 E.U./dL    Nitrite, UA Negative Negative    Leukocytes, UA Trace (!) Negative    Bacteria, UA Few (!) None Seen hpf    RBC, UA None Seen None Seen, 0-2 hpf    WBC, UA 0-5 None Seen, 0-5 hpf    Squam Epithel, UA 5-10 (!) None Seen, 0-5 lpf    Narrative    Urine Culture ordered based on [facility] criteria   Culture, Urine   Result Value Ref Range    Culture Mixture of urogenital organisms           TB testing is negative    Please call with questions or contact us using [employer].    Sincerely,        Electronically signed by Jaye Hall PA-C

## 2021-06-21 NOTE — LETTER
Letter by Nessa Harley MD at      Author: Nessa Harley MD Service: -- Author Type: --    Filed:  Encounter Date: 1/13/2021 Status: (Other)        Araceli Go  [address] [facility] [address]             January 13, 2021         Dear Araceli Donavan,    Below are the results from Araceli's recent visit:    Resulted Orders   Comprehensive Metabolic Panel   Result Value Ref Range    Sodium 140 136 - 145 mmol/L    Potassium 4.2 3.5 - 5.0 mmol/L    Chloride 104 98 - 107 mmol/L    CO2 25 22 - 31 mmol/L    Anion Gap, Calculation 11 5 - 18 mmol/L    Glucose 85 70 - 125 mg/dL    BUN 10 8 - 22 mg/dL    Creatinine 0.70 0.60 - 1.10 mg/dL    GFR MDRD Af Amer >60 >60 mL/min/1.73m2    GFR MDRD Non Af Amer >60 >60 mL/min/1.73m2    Bilirubin, Total 0.2 0.0 - 1.0 mg/dL    Calcium 9.8 8.5 - 10.5 mg/dL    Protein, Total 7.5 6.0 - 8.0 g/dL    Albumin 4.4 3.5 - 5.0 g/dL    Alkaline Phosphatase 65 45 - 120 U/L    AST 18 0 - 40 U/L    ALT 15 0 - 45 U/L    Narrative    Fasting Glucose reference range is 70-99 mg/dL per  American Diabetes Association (ADA) guidelines.   Vitamin D, Total (25-Hydroxy)   Result Value Ref Range    Vitamin D, Total (25-Hydroxy) 23.9 (L) 30.0 - 80.0 ng/mL    Narrative    Deficiency <10.0 ng/mL  Insufficiency 10.0-29.9 ng/mL  Sufficiency 30.0-80.0 ng/mL  Toxicity (possible) >100.0 ng/mL   HM1 (CBC with Diff)   Result Value Ref Range    WBC 4.7 4.0 - 11.0 thou/uL    RBC 4.43 3.80 - 5.40 mill/uL    Hemoglobin 13.1 12.0 - 16.0 g/dL    Hematocrit 40.5 35.0 - 47.0 %    MCV 91 80 - 100 fL    MCH 29.6 27.0 - 34.0 pg    MCHC 32.3 32.0 - 36.0 g/dL    RDW 12.2 11.0 - 14.5 %    Platelets 286 140 - 440 thou/uL    MPV 11.4 8.5 - 12.5 fL    Neutrophils % 58 50 - 70 %    Lymphocytes % 32 20 - 40 %    Monocytes % 8 2 - 10 %    Eosinophils % 1 0 - 6 %    Basophils % 1 0 - 2 %    Immature Granulocyte % 0 <=0 %    Neutrophils Absolute 2.7 2.0 - 7.7 thou/uL    Lymphocytes Absolute 1.5 0.8 - 4.4 thou/uL     Monocytes Absolute 0.4 0.0 - 0.9 thou/uL    Eosinophils Absolute 0.1 0.0 - 0.4 thou/uL    Basophils Absolute 0.0 0.0 - 0.2 thou/uL    Immature Granulocyte Absolute 0.0 <=0.0 thou/uL        Your result is/are stable.  Your vitamin d is slightly low and recommend over the counter vitamin d once a day.     Please call with questions or contact us using [employer].    Sincerely,        Electronically signed by Nessa Harley MD

## 2021-06-22 NOTE — PROGRESS NOTES
[facility] Child Check    ASSESSMENT & PLAN  Araceli Go is a 23 y.o. who has normal growth and abnormal development:  developmental delay.    Diagnoses and all orders for this visit:    Failed hearing screening  -     Ambulatory referral to Audiology    Other orders  -     Influenza, Seasonal Quad, Preservative Free 36+ Months        Return to clinic in 1 year for a Physical or sooner as needed    IMMUNIZATIONS/LABS  Immunizations were reviewed and orders were placed as appropriate. and I have discussed the risks and benefits of all of the vaccine components with the patient/parents.  All questions have been answered.    REFERRALS  Dental:  Recommend routine dental care as appropriate., The patient has already established care with a dentist.  Other:  No additional referrals were made at this time.    ANTICIPATORY GUIDANCE  I have reviewed age appropriate anticipatory guidance.  Social:  Friends  Parenting:  Support  Nutrition:  Junk Food  Play and Communication:  Hobbies  Health:  Sleep  Safety:  Seat Belts  Sexuality:  Body Changes    HEALTH HISTORY  Do you have any concerns that you'd like to discuss today?: No concerns      Mother is cocnerned about the patient's hearing after audiology exam that was taken in the beginning of the school year reported the patient missed several signals. IF nopt for the test report, mom would not have been concerned. Danilo does not use headphones or ear buds.     Mother notes patient has been tired lately. Patient goes to bed at 9 AM and wakes up at 7 AM. Patient reports she sometimes naps after school. Mother adds the patient sleeps at midnight and wakes up between 10 AM - 11 AM on the weekends.     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     Patient attends occupational therapy once a week.      Patient has a new a boyfriend. With previous boyfriend, they had some intimate contact, mom not sure exactly what, Danilo could  not articulate it well.  Patient did home pregnancy test and took the morning after pill.    Mom is planning to look into contraception at some point soon, before Danilo and her boyfriend start spending unsupervised time together.     Has regular periods. Takes ibuprofen prn for cramps.         Roomed by: boyd    Accompanied by Mother    Refills needed? No        Do you have any significant health concerns in your family history?: No  Family History   Adopted: Yes   Family history unknown: Yes     Since your last visit, have there been any major changes in your family, such as a move, job change, separation, divorce, or death in the family?: No  Has a lack of transportation kept you from medical appointments?: No    Home  Who lives in your home?:  Mom, sister, cats   Social History     Social History Narrative     Not on file     Do you have any concerns about losing your housing?: No  Is your housing safe and comfortable?: Yes  Do you have any trouble with sleep?:  No    Education  What school do you child attend?:  [employer]  What grade are you in?:  [employer]  How do you perform in school (grades, behavior, attention, homework?: good      Eating  Do you eat regular meals including fruits and vegetables?:  Sometimes   What are you drinking (cow's milk, water, soda, juice, sports drinks, energy drinks, etc)?: cow's milk- 1%, water, soda and juice  Have you been worried that you don't have enough food?: No  Do you have concerns about your body or appearance?:  No    Activities  Do you have friends?:  yes  Do you get at least one hour of physical activity per day?:  yes  How many hours a day are you in front of a screen other than for schoolwork (computer, TV, phone)?:  2  What do you do for exercise?:  Walk club, basketball,   Do you have interest/participate in community activities/volunteers/school sports?:  yes    MENTAL HEALTH SCREENING  No Data Recorded  No Data Recorded    VISION/HEARING  Vision:  Patient is already followed by a vision specialist  Hearing:  Completed. See Results     Hearing Screening    125Hz 250Hz 500Hz 1000Hz 2000Hz 3000Hz 4000Hz 6000Hz 8000Hz   Right ear:   20 20 20  20 25    Left ear:   20 20 20  20 25        TB Risk Assessment:  The patient and/or parent/guardian answer positive to:  patient and/or parent/guardian answer 'no' to all screening TB questions    Dyslipidemia Risk Screening  Have either of your parents or any of your grandparents had a stroke or heart attack before age 55?: unknown   Any parents with high cholesterol or currently taking medications to treat?: No     Dental  When was the last time you saw the dentist?: over 12 months ago   Parent/Guardian declines the fluoride varnish application today. Fluoride not applied today.    Patient Active Problem List   Diagnosis     Myopia     Exotropia     Delayed Developmental Milestones     Short Stature (On Exam)       Drugs  Does the patient use tobacco/alcohol/drugs?:  no    Safety  Does the patient have any safety concerns (peer or home)?:  no  Does the patient use safety belts, helmets and other safety equipment?:  yes    Sex  Have you ever had sex?:  Yes    MEASUREMENTS  Height:  5' (1.524 m)  Weight: 128 lb 6.4 oz (58.2 kg)  BMI: Body mass index is 25.08 kg/m .  Blood Pressure: 106/68  Growth percentile SmartLinks can only be used for patients less than 20 years old.    PHYSICAL EXAM  Constitutional: Appears well-developed and well-nourished. Appears younger than stated age  HEENT: Head: Normocephalic.    Right Ear: Tympanic membrane, external ear and canal normal.    Left Ear: Tympanic membrane, external ear and canal normal.    Nose: Nose normal.    Mouth/Throat: Mucous membranes are moist. Oropharynx is clear.    Eyes: Conjunctivae and lids are normal. Pupils are equal, round, and reactive to light.   Neck: Neck supple. No tenderness is present.   Cardiovascular: Normal rate and regular rhythm. No murmur  heard.  Pulmonary/Chest: Effort normal and breath sounds normal. There is normal air entry. Breast development is normal.  Joshua stage 5.   Abdominal: Soft. There is no hepatosplenomegaly. No inguinal hernia.   Musculoskeletal: Normal range of motion. Normal strength and tone. No abnormalities. Spine is straight. Normal duck walk.  Normal heel to toe walk.   : Normal external genitalia.Joshua stage 5.   Neurological: Alert, normal reflexes. Gait normal.   Psychiatric: Normal mood and affect, Has difficulty answering questions  Skin: Clear. No rashes.     ADDITIONAL HISTORY SUMMARIZED (2): None.   DECISION TO OBTAIN EXTRA INFORMATION (1): None.   RADIOLOGY TESTS (1): None.  LABS (1): None.  MEDICINE TESTS (1): None.  INDEPENDENT REVIEW (2 each): None.     Total data points = 0    Total time was 31 minutes, greater than 50% counseling and coordinating care regarding the above issues.    By signing my name below, I, Dale Mobley, attest that this documentation has been prepared under the direction and in the presence of Dr. Susu Perry.  Electronic Signature: Radha Ji. 12/20/2018 8:31 AM.    I, Dr. Susu Perry, personally performed the services described in this documentation. All medical record entries made by the scribe were at my direction and in my presence. I have reviewed the chart and discharge instructions (if applicable) and agree that the record reflects my personal performance and is accurate and complete.

## 2022-01-05 ENCOUNTER — TRANSFERRED RECORDS (OUTPATIENT)
Dept: HEALTH INFORMATION MANAGEMENT | Facility: CLINIC | Age: 27
End: 2022-01-05
Payer: COMMERCIAL

## 2022-01-14 ENCOUNTER — OFFICE VISIT (OUTPATIENT)
Dept: FAMILY MEDICINE | Facility: CLINIC | Age: 27
End: 2022-01-14
Payer: COMMERCIAL

## 2022-01-14 VITALS
DIASTOLIC BLOOD PRESSURE: 78 MMHG | BODY MASS INDEX: 26.61 KG/M2 | SYSTOLIC BLOOD PRESSURE: 100 MMHG | HEIGHT: 60 IN | WEIGHT: 135.56 LBS

## 2022-01-14 DIAGNOSIS — Z00.00 ROUTINE GENERAL MEDICAL EXAMINATION AT A HEALTH CARE FACILITY: Primary | ICD-10-CM

## 2022-01-14 DIAGNOSIS — Z86.39 HISTORY OF VITAMIN D DEFICIENCY: ICD-10-CM

## 2022-01-14 DIAGNOSIS — R62.0 DELAYED MILESTONES: ICD-10-CM

## 2022-01-14 LAB
CHOLEST SERPL-MCNC: 111 MG/DL
ERYTHROCYTE [DISTWIDTH] IN BLOOD BY AUTOMATED COUNT: 11.9 % (ref 10–15)
FASTING STATUS PATIENT QL REPORTED: NO
FASTING STATUS PATIENT QL REPORTED: NO
GLUCOSE BLD-MCNC: 78 MG/DL (ref 70–125)
HCT VFR BLD AUTO: 40.8 % (ref 35–47)
HDLC SERPL-MCNC: 37 MG/DL
HGB BLD-MCNC: 13 G/DL (ref 11.7–15.7)
LDLC SERPL CALC-MCNC: 60 MG/DL
MCH RBC QN AUTO: 29.3 PG (ref 26.5–33)
MCHC RBC AUTO-ENTMCNC: 31.9 G/DL (ref 31.5–36.5)
MCV RBC AUTO: 92 FL (ref 78–100)
PLATELET # BLD AUTO: 282 10E3/UL (ref 150–450)
RBC # BLD AUTO: 4.43 10E6/UL (ref 3.8–5.2)
TRIGL SERPL-MCNC: 70 MG/DL
TSH SERPL DL<=0.005 MIU/L-ACNC: 1.76 UIU/ML (ref 0.3–5)
WBC # BLD AUTO: 4.7 10E3/UL (ref 4–11)

## 2022-01-14 PROCEDURE — 99395 PREV VISIT EST AGE 18-39: CPT | Mod: 25 | Performed by: STUDENT IN AN ORGANIZED HEALTH CARE EDUCATION/TRAINING PROGRAM

## 2022-01-14 PROCEDURE — 90471 IMMUNIZATION ADMIN: CPT | Performed by: STUDENT IN AN ORGANIZED HEALTH CARE EDUCATION/TRAINING PROGRAM

## 2022-01-14 PROCEDURE — 85027 COMPLETE CBC AUTOMATED: CPT | Performed by: STUDENT IN AN ORGANIZED HEALTH CARE EDUCATION/TRAINING PROGRAM

## 2022-01-14 PROCEDURE — 90686 IIV4 VACC NO PRSV 0.5 ML IM: CPT | Performed by: STUDENT IN AN ORGANIZED HEALTH CARE EDUCATION/TRAINING PROGRAM

## 2022-01-14 PROCEDURE — 82947 ASSAY GLUCOSE BLOOD QUANT: CPT | Performed by: STUDENT IN AN ORGANIZED HEALTH CARE EDUCATION/TRAINING PROGRAM

## 2022-01-14 PROCEDURE — 82306 VITAMIN D 25 HYDROXY: CPT | Performed by: STUDENT IN AN ORGANIZED HEALTH CARE EDUCATION/TRAINING PROGRAM

## 2022-01-14 PROCEDURE — 84443 ASSAY THYROID STIM HORMONE: CPT | Performed by: STUDENT IN AN ORGANIZED HEALTH CARE EDUCATION/TRAINING PROGRAM

## 2022-01-14 PROCEDURE — 80061 LIPID PANEL: CPT | Performed by: STUDENT IN AN ORGANIZED HEALTH CARE EDUCATION/TRAINING PROGRAM

## 2022-01-14 PROCEDURE — 36415 COLL VENOUS BLD VENIPUNCTURE: CPT | Performed by: STUDENT IN AN ORGANIZED HEALTH CARE EDUCATION/TRAINING PROGRAM

## 2022-01-14 ASSESSMENT — MIFFLIN-ST. JEOR: SCORE: 1286.41

## 2022-01-14 NOTE — PROGRESS NOTES
SUBJECTIVE:   CC: Araceli Go is an 26 year old woman who presents for preventive health visit.       Patient has been advised of split billing requirements and indicates understanding: Yes  Healthy Habits:     Getting at least 3 servings of Calcium per day:  Yes    Bi-annual eye exam:  Yes    Dental care twice a year:  Yes    Sleep apnea or symptoms of sleep apnea:  None    Diet:  Regular (no restrictions)    Frequency of exercise:  2-3 days/week    Duration of exercise:  15-30 minutes    Taking medications regularly:  Not Applicable    Medication side effects:  Not applicable    PHQ-2 Total Score: 1    Additional concerns today:  No        Today's PHQ-2 Score:   PHQ-2 ( 1999 Pfizer) 1/14/2022   Q1: Little interest or pleasure in doing things 0   Q2: Feeling down, depressed or hopeless 1   PHQ-2 Score 1   Q1: Little interest or pleasure in doing things Not at all   Q2: Feeling down, depressed or hopeless Not at all   PHQ-2 Score 0       Abuse: Current or Past (Physical, Sexual or Emotional) - No  Do you feel safe in your environment? Yes        Social History     Tobacco Use     Smoking status: Never Smoker     Smokeless tobacco: Never Used   Substance Use Topics     Alcohol use: No         Alcohol Use 1/14/2022   Prescreen: >3 drinks/day or >7 drinks/week? No       Reviewed orders with patient.  Reviewed health maintenance and updated orders accordingly - Yes  Lab work is in process  Labs reviewed in EPIC  BP Readings from Last 3 Encounters:   01/14/22 100/78   01/11/21 108/60   01/06/20 100/58    Wt Readings from Last 3 Encounters:   01/14/22 61.5 kg (135 lb 9 oz)   01/11/21 68.5 kg (151 lb)   01/06/20 56.7 kg (125 lb)                  Patient Active Problem List   Diagnosis     Myopia     Exotropia     Delayed Developmental Milestones     Short Stature (On Exam)     No past surgical history on file.    Social History     Tobacco Use     Smoking status: Never Smoker     Smokeless tobacco: Never Used    Substance Use Topics     Alcohol use: No     Family History   Adopted: Yes         No current outpatient medications on file.     No Known Allergies    Breast Cancer Screening: NA    History of abnormal Pap smear: pap declined      Reviewed and updated as needed this visit by clinical staff   Allergies  Meds             Reviewed and updated as needed this visit by Provider               No past medical history on file.   No past surgical history on file.    Review of Systems  CONSTITUTIONAL: NEGATIVE for fever, chills, change in weight  INTEGUMENTARU/SKIN: NEGATIVE for worrisome rashes, moles or lesions  EYES: NEGATIVE for vision changes or irritation  ENT: NEGATIVE for ear, mouth and throat problems  RESP: NEGATIVE for significant cough or SOB  BREAST: NEGATIVE for masses, tenderness or discharge  CV: NEGATIVE for chest pain, palpitations or peripheral edema  GI: NEGATIVE for nausea, abdominal pain, heartburn, or change in bowel habits  : NEGATIVE for unusual urinary or vaginal symptoms. Periods are regular.  MUSCULOSKELETAL: NEGATIVE for significant arthralgias or myalgia  NEURO: NEGATIVE for weakness, dizziness or paresthesias  PSYCHIATRIC: NEGATIVE for changes in mood or affect     OBJECTIVE:   /78 (BP Location: Right arm, Patient Position: Sitting, Cuff Size: Adult Regular)   Ht 1.524 m (5')   Wt 61.5 kg (135 lb 9 oz)   LMP 01/12/2022   BMI 26.48 kg/m    Physical Exam  GENERAL: healthy, alert and no distress  EYES: Eyes grossly normal to inspection, PERRL and conjunctivae and sclerae normal  HENT: ear canals and TM's normal, nose and mouth without ulcers or lesions  NECK: no adenopathy, no asymmetry, masses, or scars and thyroid normal to palpation  RESP: lungs clear to auscultation - no rales, rhonchi or wheezes  CV: regular rate and rhythm, normal S1 S2, no S3 or S4, no murmur, click or rub, no peripheral edema and peripheral pulses strong  ABDOMEN: soft, nontender, no hepatosplenomegaly, no  masses and bowel sounds normal  MS: no gross musculoskeletal defects noted, no edema  SKIN: no suspicious lesions or rashes  NEURO: Normal strength and tone, mentation intact and speech normal  PSYCH: mentation appears normal, affect normal/bright    ASSESSMENT/PLAN:   Araceli was seen today for physical.    Diagnoses and all orders for this visit:    Routine general medical examination at a health care facility  Hx of Vit D def   Developmental Delay     Is here with her mom.     Has hx of developmental delay. Was adopted when she was an infant. Is a fraternal twin. No known fmhx. Has 2 part time jobs that she enjoys. No concerns today. (+) exercising regularly - like to do a dance class at the [employer]. Has lost weigh with the exercise. (+) screen time - like to play video games. (+) UTD on dentist and eye exam. Pap was attempted last year - too painful. Patient declines it today. No sexually active. No ETOH, MJ, vaping, drug use or smoking. No safety concerns in the home. Eats a lot of junk food - discussed dietary habits. Flu shot ordered today. Due for baseline bloodwork - ordered today. Menses monthly with dysmenorrhea but only lasts 1 day.     -     INFLUENZA VACCINE IM > 6 MONTHS VALENT IIV4 (AFLURIA/FLUZONE)  -     Vitamin D Deficiency; Future  -     Lipid Profile (Chol, Trig, HDL, LDL calc); Future  -     CBC with platelets; Future  -     TSH with free T4 reflex; Future  -     Glucose; Future  -        Patient has been advised of split billing requirements and indicates understanding: Yes  COUNSELING:  Reviewed preventive health counseling, as reflected in patient instructions    Estimated body mass index is 26.48 kg/m  as calculated from the following:    Height as of this encounter: 1.524 m (5').    Weight as of this encounter: 61.5 kg (135 lb 9 oz).    Weight management plan: Discussed healthy diet and exercise guidelines    She reports that she has never smoked. She has never used smokeless  tobacco.      Matilde Payne DO  [facility]

## 2022-01-16 LAB — DEPRECATED CALCIDIOL+CALCIFEROL SERPL-MC: 35 UG/L (ref 30–80)

## 2022-01-17 ENCOUNTER — TELEPHONE (OUTPATIENT)
Dept: PEDIATRICS | Facility: CLINIC | Age: 27
End: 2022-01-17
Payer: COMMERCIAL

## 2022-01-17 NOTE — LETTER
January 17, 2022      Araceli Go  [address] [facility] [address]        Dear ,    We are writing to inform you of your test results.    Bloodwork looks good. Will repeat in 1 year    Resulted Orders   Lipid Profile (Chol, Trig, HDL, LDL calc)   Result Value Ref Range    Cholesterol 111 <=199 mg/dL    Triglycerides 70 <=149 mg/dL    Direct Measure HDL 37 (L) >=50 mg/dL      Comment:      HDL Cholesterol Reference Range:     0-2 years:   No reference ranges established for patients under 2 years old  at [facility] for lipid analytes.    2-8 years:  Greater than 45 mg/dL     18 years and older:   Female: Greater than or equal to 50 mg/dL   Male:   Greater than or equal to 40 mg/dL    LDL Cholesterol Calculated 60 <=129 mg/dL    Patient Fasting > 8hrs? No    CBC with platelets   Result Value Ref Range    WBC Count 4.7 4.0 - 11.0 10e3/uL    RBC Count 4.43 3.80 - 5.20 10e6/uL    Hemoglobin 13.0 11.7 - 15.7 g/dL    Hematocrit 40.8 35.0 - 47.0 %    MCV 92 78 - 100 fL    MCH 29.3 26.5 - 33.0 pg    MCHC 31.9 31.5 - 36.5 g/dL    RDW 11.9 10.0 - 15.0 %    Platelet Count 282 150 - 450 10e3/uL   TSH with free T4 reflex   Result Value Ref Range    TSH 1.76 0.30 - 5.00 uIU/mL   Glucose   Result Value Ref Range    Glucose 78 70 - 125 mg/dL    Patient Fasting > 8hrs? No        If you have any questions or concerns, please call the clinic at the number listed above.       Sincerely,        Dr. Payne

## 2022-01-17 NOTE — TELEPHONE ENCOUNTER
----- Message from Matilde Payne DO sent at 1/15/2022  2:36 PM CST -----  Call patient     Bloodwork looks good. Will repeati n 1 year.

## 2022-01-17 NOTE — TELEPHONE ENCOUNTER
LMTCB   Please relay below message when call is returned. Will also send letter   Sam Mao CMA on 1/17/2022 at 9:23 AM

## 2022-01-24 NOTE — TELEPHONE ENCOUNTER
LMTCB   Please relay below message from provider if call is returned.   Sam Mao CMA on 1/24/2022 at 2:28 PM

## 2023-01-11 ENCOUNTER — TRANSFERRED RECORDS (OUTPATIENT)
Dept: HEALTH INFORMATION MANAGEMENT | Facility: CLINIC | Age: 28
End: 2023-01-11

## 2023-02-15 ENCOUNTER — OFFICE VISIT (OUTPATIENT)
Dept: FAMILY MEDICINE | Facility: CLINIC | Age: 28
End: 2023-02-15
Payer: COMMERCIAL

## 2023-02-15 VITALS
TEMPERATURE: 98 F | BODY MASS INDEX: 23.75 KG/M2 | DIASTOLIC BLOOD PRESSURE: 83 MMHG | SYSTOLIC BLOOD PRESSURE: 120 MMHG | OXYGEN SATURATION: 99 % | HEART RATE: 81 BPM | HEIGHT: 60 IN | WEIGHT: 121 LBS

## 2023-02-15 DIAGNOSIS — Z00.00 ROUTINE GENERAL MEDICAL EXAMINATION AT A HEALTH CARE FACILITY: Primary | ICD-10-CM

## 2023-02-15 PROCEDURE — 99395 PREV VISIT EST AGE 18-39: CPT | Performed by: FAMILY MEDICINE

## 2023-02-15 ASSESSMENT — ENCOUNTER SYMPTOMS
NERVOUS/ANXIOUS: 0
JOINT SWELLING: 0
CONSTIPATION: 0
SORE THROAT: 0
HEARTBURN: 0
BREAST MASS: 0
DIZZINESS: 0
FREQUENCY: 0
NAUSEA: 0
PARESTHESIAS: 0
MYALGIAS: 0
PALPITATIONS: 0
EYE PAIN: 0
HEADACHES: 0
HEMATURIA: 0
SHORTNESS OF BREATH: 0
FEVER: 0
WEAKNESS: 0
ARTHRALGIAS: 0
DIARRHEA: 0
ABDOMINAL PAIN: 0
COUGH: 0
HEMATOCHEZIA: 0
CHILLS: 0
DYSURIA: 0

## 2023-02-15 ASSESSMENT — PAIN SCALES - GENERAL: PAINLEVEL: NO PAIN (0)

## 2023-02-15 NOTE — PROGRESS NOTES
SUBJECTIVE:   CC: Araceli is an 27 year old who presents for preventive health visit.   Patient has been advised of split billing requirements and indicates understanding: Yes  Healthy Habits:     Getting at least 3 servings of Calcium per day:  Yes    Bi-annual eye exam:  Yes    Dental care twice a year:  Yes    Sleep apnea or symptoms of sleep apnea:  None    Diet:  Regular (no restrictions)    Frequency of exercise:  2-3 days/week    Duration of exercise:  15-30 minutes    Taking medications regularly:  Not Applicable    Medication side effects:  None    PHQ-2 Total Score: 0    Additional concerns today:  Yes    Today's PHQ-2 Score:   PHQ-2 ( 1999 Pfizer) 2/15/2023   Q1: Little interest or pleasure in doing things 0   Q2: Feeling down, depressed or hopeless 0   PHQ-2 Score 0   Q1: Little interest or pleasure in doing things Not at all   Q2: Feeling down, depressed or hopeless Not at all   PHQ-2 Score 0     Social History     Tobacco Use     Smoking status: Never     Smokeless tobacco: Never   Substance Use Topics     Alcohol use: No     If you drink alcohol do you typically have >3 drinks per day or >7 drinks per week? No    Alcohol Use 2/15/2023   Prescreen: >3 drinks/day or >7 drinks/week? No   Prescreen: >3 drinks/day or >7 drinks/week? -   No flowsheet data found.    Reviewed orders with patient.  Reviewed health maintenance and updated orders accordingly - Yes  BP Readings from Last 3 Encounters:   02/15/23 120/83   01/14/22 100/78   01/11/21 108/60    Wt Readings from Last 3 Encounters:   02/15/23 54.9 kg (121 lb)   01/14/22 61.5 kg (135 lb 9 oz)   01/11/21 68.5 kg (151 lb)                    Breast Cancer Screening:    Breast CA Risk Assessment (FHS-7) 2/15/2023   Do you have a family history of breast, colon, or ovarian cancer? No / Unknown     History of abnormal Pap smear: NO - age 21-29 PAP every 3 years recommended     Reviewed and updated as needed this visit by clinical staff   Tobacco   "Allergies  Meds  Problems  Med Hx  Surg Hx  Fam Hx          Reviewed and updated as needed this visit by Provider   Tobacco  Allergies  Meds  Problems  Med Hx  Surg Hx  Fam Hx           Review of Systems   Constitutional: Negative for chills and fever.   HENT: Negative for congestion, ear pain, hearing loss and sore throat.    Eyes: Negative for pain and visual disturbance.   Respiratory: Negative for cough and shortness of breath.    Cardiovascular: Negative for chest pain, palpitations and peripheral edema.   Gastrointestinal: Negative for abdominal pain, constipation, diarrhea, heartburn, hematochezia and nausea.   Breasts:  Negative for tenderness, breast mass and discharge.   Genitourinary: Negative for dysuria, frequency, genital sores, hematuria, pelvic pain, urgency, vaginal bleeding and vaginal discharge.   Musculoskeletal: Negative for arthralgias, joint swelling and myalgias.   Skin: Negative for rash.   Neurological: Negative for dizziness, weakness, headaches and paresthesias.   Psychiatric/Behavioral: Negative for mood changes. The patient is not nervous/anxious.         OBJECTIVE:   /83 (BP Location: Right arm, Patient Position: Sitting, Cuff Size: Adult Regular)   Pulse 81   Temp 98  F (36.7  C) (Temporal)   Ht 1.511 m (4' 11.5\")   Wt 54.9 kg (121 lb)   LMP 02/06/2023 (Approximate)   SpO2 99%   BMI 24.03 kg/m    Physical Exam  GENERAL: healthy, alert and no distress  EYES: Eyes grossly normal to inspection, PERRL and conjunctivae and sclerae normal  HENT: ear canals and TM's normal, nose and mouth without ulcers or lesions  NECK: no adenopathy, no asymmetry, masses, or scars and thyroid normal to palpation  RESP: lungs clear to auscultation - no rales, rhonchi or wheezes  CV: regular rate and rhythm, normal S1 S2, no S3 or S4, no murmur, click or rub, no peripheral edema and peripheral pulses strong  ABDOMEN: soft, nontender, no hepatosplenomegaly, no masses and bowel sounds " normal  MS: no gross musculoskeletal defects noted, no edema  SKIN: no suspicious lesions or rashes  PSYCH: mentation appears normal, affect normal/bright      ASSESSMENT/PLAN:   Araceli was seen today for physical.    Diagnoses and all orders for this visit:    Routine general medical examination at a health care facility  Very pleasant affect. No concerns. Declined vaccines, hep C, HIV today.   -     REVIEW OF HEALTH MAINTENANCE PROTOCOL ORDERS      Other orders  -     REVIEW OF HEALTH MAINTENANCE PROTOCOL ORDERS        Patient has been advised of split billing requirements and indicates understanding: Yes      COUNSELING:  Reviewed preventive health counseling, as reflected in patient instructions        She reports that she has never smoked. She has never used smokeless tobacco.      Jessica Torres, [facility]

## 2023-02-15 NOTE — PATIENT INSTRUCTIONS
Preventive Health Recommendations  Female Ages 21 to 25     Yearly exam:   See your health care provider every year in order to  Review health changes.   Discuss preventive care.    Review your medicines if your doctor has prescribed any.    You should be tested each year for STDs (sexually transmitted diseases).     Talk to your provider about how often you should have cholesterol testing.    Get a Pap test every three years. If you have an abnormal result, your doctor may have you test more often.    If you are at risk for diabetes, you should have a diabetes test (fasting glucose).     Shots:   Get a flu shot each year.   Get a tetanus shot every 10 years.   Consider getting the shot (vaccine) that prevents cervical cancer (Gardasil).    Nutrition:   Eat at least 5 servings of fruits and vegetables each day.  Eat whole-grain bread, whole-wheat pasta and brown rice instead of white grains and rice.  Get adequate Calcium and Vitamin D.     Lifestyle  Exercise at least 150 minutes a week each week (30 minutes a day, 5 days a week). This will help you control your weight and prevent disease.  Limit alcohol to one drink per day.  No smoking.   Wear sunscreen to prevent skin cancer.  See your dentist every six months for an exam and cleaning.

## 2023-11-14 ENCOUNTER — OFFICE VISIT (OUTPATIENT)
Dept: FAMILY MEDICINE | Facility: CLINIC | Age: 28
End: 2023-11-14
Payer: COMMERCIAL

## 2023-11-14 VITALS
OXYGEN SATURATION: 100 % | HEART RATE: 91 BPM | TEMPERATURE: 98.9 F | SYSTOLIC BLOOD PRESSURE: 120 MMHG | DIASTOLIC BLOOD PRESSURE: 80 MMHG

## 2023-11-14 DIAGNOSIS — S09.90XA INJURY OF HEAD, INITIAL ENCOUNTER: ICD-10-CM

## 2023-11-14 DIAGNOSIS — S06.0X0A CONCUSSION WITHOUT LOSS OF CONSCIOUSNESS, INITIAL ENCOUNTER: Primary | ICD-10-CM

## 2023-11-14 PROCEDURE — 99213 OFFICE O/P EST LOW 20 MIN: CPT

## 2023-11-14 ASSESSMENT — ENCOUNTER SYMPTOMS
SPEECH DIFFICULTY: 0
LIGHT-HEADEDNESS: 0
TREMORS: 0
SEIZURES: 0
HEADACHES: 1
FACIAL ASYMMETRY: 0
NUMBNESS: 0
DIZZINESS: 0
WEAKNESS: 0
PARESTHESIAS: 0
EYES NEGATIVE: 1
CONSTITUTIONAL NEGATIVE: 1

## 2023-11-14 NOTE — PROGRESS NOTES
Assessment & Plan       ICD-10-CM    1. Concussion without loss of consciousness, initial encounter  S06.0X0A       2. Injury of head, initial encounter  S09.90XA            [place] Head CT Criteria Met? No    Patient was educated on the natural course of condition.  A concussion is a mild brain injury that can cause confusion and headache. Recommend monitoring at home by a caregiver for 24 hours. Conservative measures discussed including rest, plenty of sleep, limit screen time, and analgesics (Tylenol or Ibuprofen). See your primary care provider if symptoms worsen or do not improve in 7 days. Seek emergency care if you develop worsening headache, vomiting more than 3 times, vision changes, loss of consciousness, trouble walking or talking, or confusion.       Return if symptoms worsen or fail to improve, for Follow up.    Follow up with primary care provider with any problems, questions or concerns or if symptoms worsen or fail to improve. Patient agreed to plan and verbalized understanding.     Subjective     Araceli is a 28 year old female who presents to clinic today with PCA for the following health issues:  Chief Complaint   Patient presents with    Urgent Care    Head Injury     Pt was leaving the mall and slipped on liquid on the floor and fell backward. No hx of head injury     Araceli presents with reports of head injury about 15 minutes prior to arrival to clinic. She slipped on liquid, fell back on to her back and then head. There was no loss of consciousness. She has not had nausea or vomiting. She is not on blood thinners. There is head ache present.             Review of Systems   Constitutional: Negative.    HENT: Negative.     Eyes: Negative.    Skin: Negative.    Neurological:  Positive for headaches. Negative for dizziness, tremors, seizures, syncope, facial asymmetry, speech difficulty, weakness, light-headedness, numbness and paresthesias.       Problem List:  Myopia  Exotropia  Delayed  Developmental Milestones  Short Stature (On Exam)      History reviewed. No pertinent past medical history.    Social History     Tobacco Use    Smoking status: Never     Passive exposure: Never    Smokeless tobacco: Never   Substance Use Topics    Alcohol use: No           Objective    /80   Pulse 91   Temp 98.9  F (37.2  C) (Tympanic)   SpO2 100%   Physical Exam  Constitutional:       Appearance: Normal appearance.   HENT:      Head: Normocephalic and atraumatic.   Musculoskeletal:      Cervical back: Normal range of motion and neck supple.   Skin:     General: Skin is warm and dry.   Neurological:      General: No focal deficit present.      Mental Status: She is alert and oriented to person, place, and time. Mental status is at baseline.      GCS: GCS eye subscore is 4. GCS verbal subscore is 5. GCS motor subscore is 6.      Cranial Nerves: Cranial nerves 2-12 are intact.      Sensory: Sensation is intact.      Motor: Motor function is intact.      Coordination: Coordination is intact.      Gait: Gait is intact.   Psychiatric:         Attention and Perception: Attention normal.         Mood and Affect: Mood normal.         Behavior: Behavior normal.         Thought Content: Thought content normal.         Judgment: Judgment normal.              Johny Carter PA-C

## 2023-11-30 NOTE — PROGRESS NOTES
Birth Control Consult Visit:    Assessment:   28 year old, , here for a birth control consultation   Desires sterilization consult  Developmental delay    Plan:   -Reviewed all methods of contraception including mechanism of action, procedure, effectiveness, and side effects. Both written and verbal info given. At this time, Araceli would like to go home and discuss with her mother and boyfriend. Enc to send [employer] message if OCPs, patch, ring, or Depo are desired and these can be remotely managed. If Nexplanon or an IUD is desired, enc to return for insertion. Reviewed importance of no unprotected intercourse for 2 wks prior to insertion of either LARC.   -Reviewed sterilization procedure to the best of my ability including pre and post operative procedures as well as recovery timeline. Recommended sterilization consultation with Dr. Iraheta and pt accepts referral, will call to schedule.  -Safe sex encouraged and condoms reccommended for STI protection.     40 minutes spent on the date of the encounter doing chart review, review of test results, patient visit and documentation     Ana Hammond, KUN, CNM, IBCLC  [facility]  Midwifery    Subjective:   Araceli Go is a 28 year old female who presents for birth control consultation. Araceli has a history of developmental delay. She presents with her mother, Iman, who does most of the speaking during today's visit. Iman reports Araceli has a steady boyfriend currently and although Araceli tells her mom she is not sexually active yet, she would like to be proactive and discuss birth control.  Additionally, Iman and Araceli are also interested in discussing about sterilization as Araceli has decided she does not ever want to have children.    Araceli states she is safe in her current relationship. Current contraception method: none. Sexually transmitted infection risk: low, 1 partner. No LMP recorded. Pt states it was  within the last two weeks. Menarche age 15. Menses are moderate flow with some cramping. Has never used contraception.     Review of Systems  Pertinent items are noted in HPI.      Patient Active Problem List   Diagnosis    Myopia    Exotropia    Delayed Developmental Milestones    Short Stature (On Exam)        Current Outpatient Medications:     Multiple Vitamin (MULTIVITAMIN ADULT PO), , Disp: , Rfl:    No past medical history on file.   Past Surgical History:   Procedure Laterality Date    EYE SURGERY        Family History   Adopted: Yes     OB History    Para Term  AB Living   0 0 0 0 0 0   SAB IAB Ectopic Multiple Live Births   0 0 0 0 0       Objective:   /76 (BP Location: Right arm, Patient Position: Sitting, Cuff Size: Adult Regular)   Pulse 82   Wt 55.8 kg (123 lb)   BMI 24.43 kg/m      Physical Exam:  General: Alert, well-nourished female.  Not in any apparent distress.          Answers submitted by the patient for this visit:  VERA-7 (Submitted on 2023)  VERA 7 TOTAL SCORE: 0

## 2023-12-04 ENCOUNTER — OFFICE VISIT (OUTPATIENT)
Dept: MIDWIFE SERVICES | Facility: CLINIC | Age: 28
End: 2023-12-04
Payer: COMMERCIAL

## 2023-12-04 VITALS
DIASTOLIC BLOOD PRESSURE: 76 MMHG | BODY MASS INDEX: 24.43 KG/M2 | WEIGHT: 123 LBS | SYSTOLIC BLOOD PRESSURE: 122 MMHG | HEART RATE: 82 BPM

## 2023-12-04 DIAGNOSIS — Z30.09 ENCOUNTER FOR GENERAL COUNSELING AND ADVICE ON CONTRACEPTIVE MANAGEMENT: Primary | ICD-10-CM

## 2023-12-04 DIAGNOSIS — Z30.09 CONSULTATION FOR FEMALE STERILIZATION: ICD-10-CM

## 2023-12-04 PROCEDURE — 99203 OFFICE O/P NEW LOW 30 MIN: CPT | Performed by: ADVANCED PRACTICE MIDWIFE

## 2023-12-04 ASSESSMENT — ANXIETY QUESTIONNAIRES
5. BEING SO RESTLESS THAT IT IS HARD TO SIT STILL: NOT AT ALL
GAD7 TOTAL SCORE: 0
6. BECOMING EASILY ANNOYED OR IRRITABLE: NOT AT ALL
4. TROUBLE RELAXING: NOT AT ALL
1. FEELING NERVOUS, ANXIOUS, OR ON EDGE: NOT AT ALL
GAD7 TOTAL SCORE: 0
7. FEELING AFRAID AS IF SOMETHING AWFUL MIGHT HAPPEN: NOT AT ALL
3. WORRYING TOO MUCH ABOUT DIFFERENT THINGS: NOT AT ALL
2. NOT BEING ABLE TO STOP OR CONTROL WORRYING: NOT AT ALL
IF YOU CHECKED OFF ANY PROBLEMS ON THIS QUESTIONNAIRE, HOW DIFFICULT HAVE THESE PROBLEMS MADE IT FOR YOU TO DO YOUR WORK, TAKE CARE OF THINGS AT HOME, OR GET ALONG WITH OTHER PEOPLE: NOT DIFFICULT AT ALL

## 2024-01-12 ENCOUNTER — TRANSFERRED RECORDS (OUTPATIENT)
Dept: HEALTH INFORMATION MANAGEMENT | Facility: CLINIC | Age: 29
End: 2024-01-12
Payer: COMMERCIAL

## 2024-02-19 ENCOUNTER — OFFICE VISIT (OUTPATIENT)
Dept: FAMILY MEDICINE | Facility: CLINIC | Age: 29
End: 2024-02-19
Payer: COMMERCIAL

## 2024-02-19 VITALS
HEIGHT: 60 IN | BODY MASS INDEX: 23.46 KG/M2 | SYSTOLIC BLOOD PRESSURE: 111 MMHG | WEIGHT: 119.5 LBS | OXYGEN SATURATION: 99 % | DIASTOLIC BLOOD PRESSURE: 81 MMHG | HEART RATE: 98 BPM | RESPIRATION RATE: 18 BRPM | TEMPERATURE: 97.8 F

## 2024-02-19 DIAGNOSIS — Z23 ENCOUNTER FOR VACCINATION: ICD-10-CM

## 2024-02-19 DIAGNOSIS — Z12.4 CERVICAL CANCER SCREENING: ICD-10-CM

## 2024-02-19 DIAGNOSIS — Z00.00 ENCOUNTER FOR PREVENTIVE CARE: Primary | ICD-10-CM

## 2024-02-19 PROCEDURE — 91320 SARSCV2 VAC 30MCG TRS-SUC IM: CPT | Performed by: FAMILY MEDICINE

## 2024-02-19 PROCEDURE — 99395 PREV VISIT EST AGE 18-39: CPT | Mod: 25 | Performed by: FAMILY MEDICINE

## 2024-02-19 PROCEDURE — 90471 IMMUNIZATION ADMIN: CPT | Performed by: FAMILY MEDICINE

## 2024-02-19 PROCEDURE — 90480 ADMN SARSCOV2 VAC 1/ONLY CMP: CPT | Performed by: FAMILY MEDICINE

## 2024-02-19 PROCEDURE — G0145 SCR C/V CYTO,THINLAYER,RESCR: HCPCS | Performed by: FAMILY MEDICINE

## 2024-02-19 PROCEDURE — 90686 IIV4 VACC NO PRSV 0.5 ML IM: CPT | Performed by: FAMILY MEDICINE

## 2024-02-19 SDOH — HEALTH STABILITY: PHYSICAL HEALTH: ON AVERAGE, HOW MANY DAYS PER WEEK DO YOU ENGAGE IN MODERATE TO STRENUOUS EXERCISE (LIKE A BRISK WALK)?: 3 DAYS

## 2024-02-19 ASSESSMENT — PAIN SCALES - GENERAL: PAINLEVEL: NO PAIN (0)

## 2024-02-19 ASSESSMENT — SOCIAL DETERMINANTS OF HEALTH (SDOH): HOW OFTEN DO YOU GET TOGETHER WITH FRIENDS OR RELATIVES?: MORE THAN THREE TIMES A WEEK

## 2024-02-19 NOTE — PROGRESS NOTES
Preventive Care Visit  [facility]  VANGIE DIAZ DO, Family Medicine  Feb 19, 2024    Assessment & Plan     Encounter for preventive care  Patient is a well appearing 28 year old female here for physical. Doing well, no concerns. Did attempt speculum exam, but unable to complete due to poor tolerance. Did do a swab for screening purposes.   - REVIEW OF HEALTH MAINTENANCE PROTOCOL ORDERS    Cervical cancer screening  See above  - Pap Screen reflex to HPV if ASCUS - recommend age 25 - 29    Encounter for vaccination  - INFLUENZA VACCINE IM > 6 MONTHS VALENT IIV4 (AFLURIA/FLUZONE)  - COVID-19 12+ (2023-24) (PFIZER)          Counseling  Appropriate preventive services were discussed with this patient, including applicable screening as appropriate for fall prevention, nutrition, physical activity, Tobacco-use cessation, weight loss and cognition.  Checklist reviewing preventive services available has been given to the patient.  Reviewed patient's diet, addressing concerns and/or questions.   She is at risk for lack of exercise and has been provided with information to increase physical activity for the benefit of her well-being.             Subjective   Araceli is a 28 year old, presenting for the following:  Physical        2/19/2024    10:19 AM   Additional Questions   Roomed by Art KRAMER   Accompanied by Mom & Sister        Health Care Directive  Patient does not have a Health Care Directive or Living Will: Advance Directive received and scanned. Click on Code in the patient header to view.    HPI    No concerns        2/19/2024   General Health   How would you rate your overall physical health? Excellent   Feel stress (tense, anxious, or unable to sleep) Not at all         2/19/2024   Nutrition   Three or more servings of calcium each day? Yes   Diet: Regular (no restrictions)   How many servings of fruit and vegetables per day? (!) 2-3   How many sweetened beverages each day? 0-1          2/19/2024   Exercise   Days per week of moderate/strenous exercise 3 days         2/19/2024   Social Factors   Frequency of gathering with friends or relatives More than three times a week   Worry food won't last until get money to buy more No   Food not last or not have enough money for food? No   Do you have housing?  Yes   Are you worried about losing your housing? No   Lack of transportation? No   Unable to get utilities (heat,electricity)? No         2/19/2024   Dental   Dentist two times every year? Yes          No data to display                  Today's PHQ-2 Score:       2/19/2024    10:11 AM   PHQ-2 ( 1999 Pfizer)   Q1: Little interest or pleasure in doing things 0   Q2: Feeling down, depressed or hopeless 0   PHQ-2 Score 0   Q1: Little interest or pleasure in doing things Not at all   Q2: Feeling down, depressed or hopeless Not at all   PHQ-2 Score 0           2/19/2024   Substance Use   Alcohol more than 3/day or more than 7/wk No   Do you use any other substances recreationally? No     Social History     Tobacco Use    Smoking status: Never     Passive exposure: Never    Smokeless tobacco: Never   Substance Use Topics    Alcohol use: No    Drug use: No           2/19/2024   Breast Cancer Screening   Family history of breast, colon, or ovarian cancer? No / Unknown              2/19/2024   STI Screening   New sexual partner(s) since last STI/HIV test? No     History of abnormal Pap smear: NO - age 21-29 PAP every 3 years recommended             2/19/2024   Contraception/Family Planning   Questions about contraception or family planning No     Reviewed and updated as needed this visit by Provider   Tobacco  Allergies  Meds  Problems  Med Hx  Surg Hx  Fam Hx            History reviewed. No pertinent past medical history.  Past Surgical History:   Procedure Laterality Date    EYE SURGERY           Review of Systems  Constitutional, HEENT, cardiovascular, pulmonary, GI, , musculoskeletal,  neuro, skin, endocrine and psych systems are negative, except as otherwise noted.     Objective    Exam  /81 (BP Location: Right arm, Patient Position: Sitting, Cuff Size: Adult Regular)   Pulse 98   Temp 97.8  F (36.6  C) (Oral)   Resp 18   Ht 1.524 m (5')   Wt 54.2 kg (119 lb 8 oz)   LMP 01/18/2024 (Exact Date)   SpO2 99%   BMI 23.34 kg/m     Estimated body mass index is 23.34 kg/m  as calculated from the following:    Height as of this encounter: 1.524 m (5').    Weight as of this encounter: 54.2 kg (119 lb 8 oz).    Physical Exam  GENERAL: alert and no distress  EYES: Eyes grossly normal to inspection, PERRL and conjunctivae and sclerae normal  HENT: normal cephalic/atraumatic, ear canals and TM's normal, nose and mouth without ulcers or lesions, oropharynx clear, oral mucous membranes moist, and torus palatinus present  NECK: no adenopathy, no asymmetry, masses, or scars  RESP: lungs clear to auscultation - no rales, rhonchi or wheezes  CV: regular rate and rhythm, normal S1 S2, no S3 or S4, no murmur, click or rub, no peripheral edema  ABDOMEN: soft, nontender, no hepatosplenomegaly, no masses and bowel sounds normal   (female): normal female external genitalia, normal urethral meatus, erythematous mucosa on the labia minora   MS: no gross musculoskeletal defects noted, no edema  SKIN: Multiple flat brown macules scattered along the thighs bilaterally  PSYCH: mentation appears normal, affect normal/bright      Signed Electronically by: VANGIE DIAZ DO

## 2024-02-21 LAB
BKR LAB AP GYN ADEQUACY: NORMAL
BKR LAB AP GYN INTERPRETATION: NORMAL
BKR LAB AP HPV REFLEX: NORMAL
BKR LAB AP LMP: NORMAL
BKR LAB AP PREVIOUS ABNORMAL: NORMAL
PATH REPORT.COMMENTS IMP SPEC: NORMAL
PATH REPORT.COMMENTS IMP SPEC: NORMAL
PATH REPORT.RELEVANT HX SPEC: NORMAL

## 2024-06-19 ENCOUNTER — TELEPHONE (OUTPATIENT)
Dept: FAMILY MEDICINE | Facility: CLINIC | Age: 29
End: 2024-06-19
Payer: COMMERCIAL

## 2024-06-19 NOTE — TELEPHONE ENCOUNTER
Spoke with mom to let her know form can be dropped off since patient has been seen recently this year for physical. Mom thanked for the call

## 2024-06-19 NOTE — TELEPHONE ENCOUNTER
Forms/Letter Request    Type of form/letter:  [employer]     Have you been seen for this request: No    Do we have the form/letter: No    When is form/letter needed by: ASAP -- pts mother is wondering if she can just drop form off to be filled out or if pt needs to be seen.     How would you like the form/letter returned:  Form has not been rcvd yet.     Patient Notified form requests are processed in 3-5 business days:Yes    Okay to leave a detailed message?: No at Cell number on file:    Telephone Information:   Mobile [phone]

## 2024-06-20 ENCOUNTER — TELEPHONE (OUTPATIENT)
Dept: FAMILY MEDICINE | Facility: CLINIC | Age: 29
End: 2024-06-20
Payer: COMMERCIAL

## 2024-06-20 NOTE — TELEPHONE ENCOUNTER
Forms/Letter Request:     Type of form/letter: Sports- [employer]      Do we have the form/letter: Yes: IN PROVIDER INBOX AT     Who is the form from? PARENT  (if other please explain)    Where did/will the form come from? Patient or family brought in       When is form/letter needed by: ANYTIME    How would you like the form/letter returned:     Patient Notified form requests are processed in 5-7 business days:Yes    Okay to leave a detailed message?: Yes at Cell number on file:    Telephone Information:   Mobile [phone]        I didn't have access to [email or web address].

## 2024-06-20 NOTE — TELEPHONE ENCOUNTER
Provider out of office, will return on 6/24/24.    Placed on covering provider (Kriss) desk to review and if she can complete.

## 2024-06-24 NOTE — TELEPHONE ENCOUNTER
Left message, copies made for monthly hold bin and sent to scan.    Place original copy at the , per pt request.

## 2024-10-24 ENCOUNTER — OFFICE VISIT (OUTPATIENT)
Dept: OBGYN | Facility: CLINIC | Age: 29
End: 2024-10-24
Payer: COMMERCIAL

## 2024-10-24 ENCOUNTER — PREP FOR PROCEDURE (OUTPATIENT)
Dept: OBGYN | Facility: CLINIC | Age: 29
End: 2024-10-24

## 2024-10-24 VITALS
HEART RATE: 86 BPM | WEIGHT: 112 LBS | BODY MASS INDEX: 21.99 KG/M2 | DIASTOLIC BLOOD PRESSURE: 62 MMHG | SYSTOLIC BLOOD PRESSURE: 108 MMHG | OXYGEN SATURATION: 98 % | HEIGHT: 60 IN

## 2024-10-24 DIAGNOSIS — Z30.09 ENCOUNTER FOR OTHER GENERAL COUNSELING OR ADVICE ON CONTRACEPTION: Primary | ICD-10-CM

## 2024-10-24 DIAGNOSIS — Z30.09 UNWANTED FERTILITY: Primary | ICD-10-CM

## 2024-10-24 PROCEDURE — 99213 OFFICE O/P EST LOW 20 MIN: CPT | Performed by: OBSTETRICS & GYNECOLOGY

## 2024-10-24 ASSESSMENT — ANXIETY QUESTIONNAIRES
3. WORRYING TOO MUCH ABOUT DIFFERENT THINGS: NOT AT ALL
7. FEELING AFRAID AS IF SOMETHING AWFUL MIGHT HAPPEN: NOT AT ALL
4. TROUBLE RELAXING: NOT AT ALL
GAD7 TOTAL SCORE: 1
2. NOT BEING ABLE TO STOP OR CONTROL WORRYING: NOT AT ALL
IF YOU CHECKED OFF ANY PROBLEMS ON THIS QUESTIONNAIRE, HOW DIFFICULT HAVE THESE PROBLEMS MADE IT FOR YOU TO DO YOUR WORK, TAKE CARE OF THINGS AT HOME, OR GET ALONG WITH OTHER PEOPLE: NOT DIFFICULT AT ALL
GAD7 TOTAL SCORE: 1
5. BEING SO RESTLESS THAT IT IS HARD TO SIT STILL: NOT AT ALL
7. FEELING AFRAID AS IF SOMETHING AWFUL MIGHT HAPPEN: NOT AT ALL
6. BECOMING EASILY ANNOYED OR IRRITABLE: SEVERAL DAYS
8. IF YOU CHECKED OFF ANY PROBLEMS, HOW DIFFICULT HAVE THESE MADE IT FOR YOU TO DO YOUR WORK, TAKE CARE OF THINGS AT HOME, OR GET ALONG WITH OTHER PEOPLE?: NOT DIFFICULT AT ALL
1. FEELING NERVOUS, ANXIOUS, OR ON EDGE: NOT AT ALL
GAD7 TOTAL SCORE: 1

## 2024-10-24 NOTE — PROGRESS NOTES
CC: The patient is being seen secondary to a desire for no pregnancies.    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  She has a developmental delay, and is here with her mother Iman who is her legal guardian and decision maker.  She does have a boyfriend and is potentially thinking about becoming sexually active.    No past medical history on file.    Past Surgical History:   Procedure Laterality Date    EYE SURGERY         Patient's   Family History   Adopted: Yes       Patient   Social History     Socioeconomic History    Marital status: Single    Number of children: 0   Tobacco Use    Smoking status: Never     Passive exposure: Never    Smokeless tobacco: Never   Substance and Sexual Activity    Alcohol use: No    Drug use: No    Sexual activity: Never     Social Drivers of Health     Financial Resource Strain: Low Risk  (2/19/2024)    Financial Resource Strain     Within the past 12 months, have you or your family members you live with been unable to get utilities (heat, electricity) when it was really needed?: No   Food Insecurity: Low Risk  (2/19/2024)    Food Insecurity     Within the past 12 months, did you worry that your food would run out before you got money to buy more?: No     Within the past 12 months, did the food you bought just not last and you didn t have money to get more?: No   Transportation Needs: Low Risk  (2/19/2024)    Transportation Needs     Within the past 12 months, has lack of transportation kept you from medical appointments, getting your medicines, non-medical meetings or appointments, work, or from getting things that you need?: No   Physical Activity: Unknown (2/19/2024)    Exercise Vital Sign     Days of Exercise per Week: 3 days   Stress: No Stress Concern Present (2/19/2024)    [employer] [place] [employer] - Occupational Stress Questionnaire     Feeling of Stress : Not at all   Social Connections: Unknown  (2/19/2024)    Social Connection and Isolation Panel [NHANES]     Frequency of Social Gatherings with Friends and Family: More than three times a week   Housing Stability: Low Risk  (2/19/2024)    Housing Stability     Do you have housing? : Yes     Are you worried about losing your housing?: No       Outpatient Medications Prior to Visit   Medication Sig Dispense Refill    Multiple Vitamin (MULTIVITAMIN ADULT PO)        No facility-administered medications prior to visit.       Patient has No Known Allergies.    ROS:  12 part ROS is negative aside from those symptoms in the HPI    PE:  /62 (BP Location: Right arm, Patient Position: Sitting, Cuff Size: Adult Regular)   Pulse 86   Ht 1.524 m (5')   Wt 50.8 kg (112 lb)   LMP 10/03/2024           Body mass index is 21.87 kg/m .    General: WN/WD WF, NAD  Psych: normal mood  Neuro: CN I-XII grossly intact  MS: normal gait    Assessment: 29 year old SWF P0 with a desire for permanent contraception.    Plan: LARCs were discussed with the patient.  Laparoscopic tubal ligation was discussed with the patient.  We discussed that most of the time the surgery is actually a bilateral salpingectomy to help reduce the risk of ovarian cancer.  She was counseled on the permanence of the procedure, the approximately 1/200 failure rate and the increased risk for ectopic should pregnancy occur.  She was counseled on the pros and cons of LTL including immediate effect but general anesthesia and 3 incisions.  Questions were answered.  She will be notified when the procedure is scheduled.  She was counseled on the need for someone to drive her home and stay with her after surgery, the need for a pre-op exam prior to the surgery, the need to have nothing to eat for 8 hour prior to surgery and nothing to drink after 2 hours prior to surgery, and that the surgery center would call her a day or two before the procedure to go over details of the process and what time to arrive.  She  and her mother both signed the MA consent form today.    35 minutes spent on the date of the encounter doing chart review, patient visit, and documentation    Answers submitted by the patient for this visit:  Patient Health Questionnaire (G7) (Submitted on 10/24/2024)  VERA 7 TOTAL SCORE: 1

## 2024-12-04 ENCOUNTER — OFFICE VISIT (OUTPATIENT)
Dept: FAMILY MEDICINE | Facility: CLINIC | Age: 29
End: 2024-12-04
Payer: COMMERCIAL

## 2024-12-04 VITALS
HEIGHT: 60 IN | BODY MASS INDEX: 22.28 KG/M2 | OXYGEN SATURATION: 100 % | HEART RATE: 83 BPM | SYSTOLIC BLOOD PRESSURE: 102 MMHG | RESPIRATION RATE: 16 BRPM | TEMPERATURE: 98 F | DIASTOLIC BLOOD PRESSURE: 64 MMHG | WEIGHT: 113.5 LBS

## 2024-12-04 DIAGNOSIS — Z23 ENCOUNTER FOR VACCINATION: ICD-10-CM

## 2024-12-04 DIAGNOSIS — Z01.818 PREOP GENERAL PHYSICAL EXAM: ICD-10-CM

## 2024-12-04 DIAGNOSIS — Z30.09 UNWANTED FERTILITY: Primary | ICD-10-CM

## 2024-12-04 PROCEDURE — 90471 IMMUNIZATION ADMIN: CPT | Performed by: FAMILY MEDICINE

## 2024-12-04 PROCEDURE — 91320 SARSCV2 VAC 30MCG TRS-SUC IM: CPT | Performed by: FAMILY MEDICINE

## 2024-12-04 PROCEDURE — 90656 IIV3 VACC NO PRSV 0.5 ML IM: CPT | Performed by: FAMILY MEDICINE

## 2024-12-04 PROCEDURE — 90480 ADMN SARSCOV2 VAC 1/ONLY CMP: CPT | Performed by: FAMILY MEDICINE

## 2024-12-04 PROCEDURE — 99213 OFFICE O/P EST LOW 20 MIN: CPT | Mod: 25 | Performed by: FAMILY MEDICINE

## 2024-12-04 ASSESSMENT — PAIN SCALES - GENERAL: PAINLEVEL_OUTOF10: NO PAIN (0)

## 2024-12-04 NOTE — PROGRESS NOTES
Preoperative Evaluation  [address]  [facility] [address]  Phone: [phone]  Fax: [phone]  Primary Provider: VANGIE DIAZ DO  Pre-op Performing Provider: VANGIE DIAZ DO  Dec 4, 2024           12/4/2024   Surgical Information   What procedure is being done? fallopian tube removal    Facility or Hospital where procedure/surgery will be performed: upstairs    Who is doing the procedure / surgery? [facility]    Date of surgery / procedure: 1211    Time of surgery / procedure: morning    Where do you plan to recover after surgery? at home with family        Patient-reported     Fax number for surgical facility: Note does not need to be faxed, will be available electronically in Epic.    Assessment & Plan     The proposed surgical procedure is considered INTERMEDIATE risk.    Unwanted fertility  Chronic, not well controlled. Due to insight impairments, would not be good fit for daily oral contraceptives. Agree with pursuing permanent sterilization.     Preop general physical exam  Patient with no cardiac concerns, is fairly functional at job, which involves physical labor.     Encounter for vaccination  Immunizations Administered       Name Date Dose VIS Date Route    COVID-19 12+ (Pfizer) 12/4/24  8:44 AM 0.3 mL EUI,10/17/2024,Given today Intramuscular    Influenza, Split Virus, Trivalent, Pf (Fluzone\Fluarix) 12/4/24  8:44 AM 0.5 mL 08/06/2021,Given Today Intramuscular                  - No identified additional risk factors other than previously addressed    Preoperative Medication Instructions  Antiplatelet or Anticoagulation Medication Instructions   - Patient is on no antiplatelet or anticoagulation medications.    Additional Medication Instructions  Patient is on no additional chronic medications    Recommendation  Approval given to proceed with proposed procedure, without further diagnostic evaluation.    Feli Charels is  a 29 year old, presenting for the following:  Pre-Op Exam          12/4/2024     8:11 AM   Additional Questions   Roomed by Daniel VARGAS MA   Accompanied by Mom, Sade CONTRERAS related to upcoming procedure:     Patient would like to undergo sterilization to prevent future child bearing. She has discussed with obstetrician and signed appropriate paperwork.         12/4/2024   Pre-Op Questionnaire   Have you ever had a heart attack or stroke? No    Have you ever had surgery on your heart or blood vessels, such as a stent placement, a coronary artery bypass, or surgery on an artery in your head, neck, heart, or legs? No    Do you have chest pain with activity? No    Do you have a history of heart failure? No    Do you currently have a cold, bronchitis or symptoms of other infection? No    Do you have a cough, shortness of breath, or wheezing? No    Do you or anyone in your family have previous history of blood clots? (!) UNKNOWN - no issues with bleeding with eye surgery, dental work    Do you or does anyone in your family have a serious bleeding problem such as prolonged bleeding following surgeries or cuts? (!) UNKNOWN - no complications for self with dental procedures    Have you ever had problems with anemia or been told to take iron pills? No    Have you had any abnormal blood loss such as black, tarry or bloody stools, or abnormal vaginal bleeding? No    Have you ever had a blood transfusion? No    Are you willing to have a blood transfusion if it is medically needed before, during, or after your surgery? Yes    Have you or any of your relatives ever had problems with anesthesia? No    Do you have sleep apnea, excessive snoring or daytime drowsiness? No    Do you have any artifical heart valves or other implanted medical devices like a pacemaker, defibrillator, or continuous glucose monitor? No    Do you have artificial joints? No    Are you allergic to latex? No        Patient-reported     Health Care  "Directive  Patient does not have a Health Care Directive: Discussed advance care planning with patient; information given to patient to review.    Preoperative Review of    reviewed - no record of controlled substances prescribed.    Patient Active Problem List    Diagnosis Date Noted    Unwanted fertility 10/24/2024     Priority: Medium    Exotropia      Priority: Medium     Created by Conversion  Replacement Utility updated for latest IMO load        Myopia      Priority: Medium     Created by Conversion        Delayed Developmental Milestones      Priority: Medium     Created by Conversion  [facility] Annotation: Nov 20 2007 12:49PM - Chanel Rendon: Has had   cognitive delays - is in special education.        Short Stature (On Exam)      Priority: Medium     Created by Conversion  [facility] Annotation: Nov 20 2007 12:53PM - Chanel Rendon: Referred   to   Endo 11/07.          History reviewed. No pertinent past medical history.  Past Surgical History:   Procedure Laterality Date    EYE SURGERY       Current Outpatient Medications   Medication Sig Dispense Refill    Multiple Vitamin (MULTIVITAMIN ADULT PO)          No Known Allergies     Social History     Tobacco Use    Smoking status: Never     Passive exposure: Never    Smokeless tobacco: Never   Substance Use Topics    Alcohol use: No     Family History   Adopted: Yes     History   Drug Use No             Review of Systems  Constitutional, neuro, ENT, endocrine, pulmonary, cardiac, gastrointestinal, genitourinary, musculoskeletal, integument and psychiatric systems are negative, except as otherwise noted.    Objective    /64 (BP Location: Right arm, Patient Position: Sitting, Cuff Size: Adult Regular)   Pulse 83   Temp 98  F (36.7  C) (Oral)   Resp 16   Ht 1.515 m (4' 11.65\")   Wt 51.5 kg (113 lb 8 oz)   LMP 11/08/2024 (Approximate)   SpO2 100%   BMI 22.43 kg/m     Estimated body mass index is 22.43 kg/m  as calculated from the " "following:    Height as of this encounter: 1.515 m (4' 11.65\").    Weight as of this encounter: 51.5 kg (113 lb 8 oz).  Physical Exam  GENERAL: alert and no distress  EYES: Eyes grossly normal to inspection, PERRL and conjunctivae and sclerae normal  HENT: normal cephalic/atraumatic, both ears: occluded with wax, nose and mouth without ulcers or lesions, oropharynx clear, and oral mucous membranes moist  NECK: no adenopathy, no asymmetry, masses, or scars  RESP: lungs clear to auscultation - no rales, rhonchi or wheezes  CV: regular rate and rhythm, normal S1 S2, no S3 or S4, no murmur, click or rub, no peripheral edema  ABDOMEN: soft, nontender, no hepatosplenomegaly, no masses and bowel sounds normal  MS: no gross musculoskeletal defects noted, no edema  SKIN: no suspicious lesions or rashes  NEURO: Normal strength and tone, mentation intact and speech normal  PSYCH: mentation appears normal, affect normal/bright    No results for input(s): \"HGB\", \"PLT\", \"INR\", \"NA\", \"POTASSIUM\", \"CR\", \"A1C\" in the last 8760 hours.     Diagnostics  No labs were ordered during this visit.   No EKG required, no history of coronary heart disease, significant arrhythmia, peripheral arterial disease or other structural heart disease.    Revised Cardiac Risk Index (RCRI)  The patient has the following serious cardiovascular risks for perioperative complications:   - No serious cardiac risks = 0 points     RCRI Interpretation: 0 points: Class I (very low risk - 0.4% complication rate)         Signed Electronically by: VANGIE DIAZ DO  A copy of this evaluation report is provided to the requesting physician.         "

## 2024-12-04 NOTE — PATIENT INSTRUCTIONS
How to Take Your Medication Before Surgery  Preoperative Medication Instructions   Antiplatelet or Anticoagulation Medication Instructions   - Patient is on no antiplatelet or anticoagulation medications.    Additional Medication Instructions  Patient is on no additional chronic medications       Patient Education   Preparing for Your Surgery  For Adults  Getting started  In most cases, a nurse will call to review your health history and instructions. They will give you an arrival time based on your scheduled surgery time. Please be ready to share:  Your doctor's clinic name and phone number  Your medical, surgical, and anesthesia history  A list of allergies and sensitivities  A list of medicines, including herbal treatments and over-the-counter drugs  Whether the patient has a legal guardian (ask how to send us the papers in advance)  Note: You may not receive a call if you were seen at our PAC (Preoperative Assessment Center).  Please tell us if you're pregnant--or if there's any chance you might be pregnant. Some surgeries may injure a fetus (unborn baby), so they require a pregnancy test. Surgeries that are safe for a fetus don't always need a test, and you can choose whether to have one.   Preparing for surgery  Within 10 to 30 days of surgery: Have a pre-op exam (sometimes called an H&P, or History and Physical). This can be done at a clinic or pre-operative center.  If you're having a , you may not need this exam. Talk to your care team.  At your pre-op exam, talk to your care team about all medicines you take. (This includes CBD oil and any drugs, such as THC, marijuana, and other forms of cannabis.) If you need to stop any medicine before surgery, ask when to start taking it again.  This is for your safety. Many medicines and drugs can make you bleed too much during surgery. Some change how well surgery (anesthesia) drugs work.  Call your insurance company to let them know you're having surgery.  (If you don't have insurance, call [phone].)  Call your clinic if there's any change in your health. This includes a scrape or scratch near the surgery site, or any signs of a cold (sore throat, runny nose, cough, rash, fever).  Eating and drinking guidelines  For your safety: Unless your surgeon tells you otherwise, follow the guidelines below.  Eat and drink as normal until 8 hours before you arrive for surgery. After that, no food or milk. You can spit out gum when you arrive.  Drink clear liquids until 2 hours before you arrive. These are liquids you can see through, like water, Gatorade, and Propel Water. They also include plain black coffee and tea (no cream or milk).  No alcohol for 24 hours before you arrive. The night before surgery, stop any drinks that contain THC.  If your care team tells you to take medicine on the morning of surgery, it's okay to take it with a sip of water. No other medicines or drugs are allowed (including CBD oil)--follow your care team's instructions.  If you have questions the day of surgery, call your hospital or surgery center.   Preventing infection  Shower or bathe the night before and the morning of surgery. Follow the instructions your clinic gave you. (If no instructions, use regular soap.)  Don't shave or clip hair near your surgery site. We'll remove the hair if needed.  Don't smoke or vape the morning of surgery. No chewing tobacco for 6 hours before you arrive. A nicotine patch is okay. You may spit out nicotine gum when you arrive.  For some surgeries, the surgeon will tell you to fully quit smoking and nicotine.  We will make every effort to keep you safe from infection. We will:  Clean our hands often with soap and water (or an alcohol-based hand rub).  Clean the skin at your surgery site with a special soap that kills germs.  Give you a special gown to keep you warm. (Cold raises the risk of infection.)  Wear hair covers, masks, gowns, and gloves during  surgery.  Give antibiotic medicine, if prescribed. Not all surgeries need this medicine.  What to bring on the day of surgery  Photo ID and insurance card  Copy of your health care directive, if you have one  Glasses and hearing aids (bring cases)  You can't wear contacts during surgery  Inhaler and eye drops, if you use them (tell us about these when you arrive)  CPAP machine or breathing device, if you use them  A few personal items, if spending the night  If you have . . .  A pacemaker, ICD (cardiac defibrillator), or other implant: Bring the ID card.  An implanted stimulator: Bring the remote control.  A legal guardian: Bring a copy of the certified (court-stamped) guardianship papers.  Please remove any jewelry, including body piercings. Leave jewelry and other valuables at home.  If you're going home the day of surgery  You must have a responsible adult drive you home. They should stay with you overnight as well.  If you don't have someone to stay with you, and you aren't safe to go home alone, we may keep you overnight. Insurance often won't pay for this.  After surgery  If it's hard to control your pain or you need more pain medicine, please call your surgeon's office.  Questions?   If you have any questions for your care team, list them here:   ____________________________________________________________________________________________________________________________________________________________________________________________________________________________________________________________  For informational purposes only. Not to replace the advice of your health care provider. Copyright   2003, 2019 [facility]. All rights reserved. Clinically reviewed by Edy South MD. [employer] [id number] - REV 08/24.

## 2024-12-04 NOTE — H&P (VIEW-ONLY)
Preoperative Evaluation  [address]  [facility] [address]  Phone: [phone]  Fax: [phone]  Primary Provider: VANGIE DIAZ DO  Pre-op Performing Provider: VANGIE DIAZ DO  Dec 4, 2024           12/4/2024   Surgical Information   What procedure is being done? fallopian tube removal    Facility or Hospital where procedure/surgery will be performed: upstairs    Who is doing the procedure / surgery? Osmel    Date of surgery / procedure: 1211    Time of surgery / procedure: morning    Where do you plan to recover after surgery? at home with family        Patient-reported     Fax number for surgical facility: Note does not need to be faxed, will be available electronically in Epic.    Assessment & Plan     The proposed surgical procedure is considered INTERMEDIATE risk.    Unwanted fertility  Chronic, not well controlled. Due to insight impairments, would not be good fit for daily oral contraceptives. Agree with pursuing permanent sterilization.     Preop general physical exam  Patient with no cardiac concerns, is fairly functional at job, which involves physical labor.     Encounter for vaccination  Immunizations Administered       Name Date Dose VIS Date Route    COVID-19 12+ (Pfizer) 12/4/24  8:44 AM 0.3 mL EUI,10/17/2024,Given today Intramuscular    Influenza, Split Virus, Trivalent, Pf (Fluzone\Fluarix) 12/4/24  8:44 AM 0.5 mL 08/06/2021,Given Today Intramuscular                  - No identified additional risk factors other than previously addressed    Preoperative Medication Instructions  Antiplatelet or Anticoagulation Medication Instructions   - Patient is on no antiplatelet or anticoagulation medications.    Additional Medication Instructions  Patient is on no additional chronic medications    Recommendation  Approval given to proceed with proposed procedure, without further diagnostic evaluation.    Feli Charles is  a 29 year old, presenting for the following:  Pre-Op Exam          12/4/2024     8:11 AM   Additional Questions   Roomed by Daniel VARGAS MA   Accompanied by Mom, Sade CONTRERAS related to upcoming procedure:     Patient would like to undergo sterilization to prevent future child bearing. She has discussed with obstetrician and signed appropriate paperwork.         12/4/2024   Pre-Op Questionnaire   Have you ever had a heart attack or stroke? No    Have you ever had surgery on your heart or blood vessels, such as a stent placement, a coronary artery bypass, or surgery on an artery in your head, neck, heart, or legs? No    Do you have chest pain with activity? No    Do you have a history of heart failure? No    Do you currently have a cold, bronchitis or symptoms of other infection? No    Do you have a cough, shortness of breath, or wheezing? No    Do you or anyone in your family have previous history of blood clots? (!) UNKNOWN - no issues with bleeding with eye surgery, dental work    Do you or does anyone in your family have a serious bleeding problem such as prolonged bleeding following surgeries or cuts? (!) UNKNOWN - no complications for self with dental procedures    Have you ever had problems with anemia or been told to take iron pills? No    Have you had any abnormal blood loss such as black, tarry or bloody stools, or abnormal vaginal bleeding? No    Have you ever had a blood transfusion? No    Are you willing to have a blood transfusion if it is medically needed before, during, or after your surgery? Yes    Have you or any of your relatives ever had problems with anesthesia? No    Do you have sleep apnea, excessive snoring or daytime drowsiness? No    Do you have any artifical heart valves or other implanted medical devices like a pacemaker, defibrillator, or continuous glucose monitor? No    Do you have artificial joints? No    Are you allergic to latex? No        Patient-reported     Health Care  "Directive  Patient does not have a Health Care Directive: Discussed advance care planning with patient; information given to patient to review.    Preoperative Review of    reviewed - no record of controlled substances prescribed.    Patient Active Problem List    Diagnosis Date Noted    Unwanted fertility 10/24/2024     Priority: Medium    Exotropia      Priority: Medium     Created by Conversion  Replacement Utility updated for latest IMO load        Myopia      Priority: Medium     Created by Conversion        Delayed Developmental Milestones      Priority: Medium     Created by Conversion  [facility] Annotation: Nov 20 2007 12:49PM - Chanel Rendon: Has had   cognitive delays - is in special education.        Short Stature (On Exam)      Priority: Medium     Created by Conversion  [facility] Annotation: Nov 20 2007 12:53PM - Chanel Rendon: Referred   to   Endo 11/07.          History reviewed. No pertinent past medical history.  Past Surgical History:   Procedure Laterality Date    EYE SURGERY       Current Outpatient Medications   Medication Sig Dispense Refill    Multiple Vitamin (MULTIVITAMIN ADULT PO)          No Known Allergies     Social History     Tobacco Use    Smoking status: Never     Passive exposure: Never    Smokeless tobacco: Never   Substance Use Topics    Alcohol use: No     Family History   Adopted: Yes     History   Drug Use No             Review of Systems  Constitutional, neuro, ENT, endocrine, pulmonary, cardiac, gastrointestinal, genitourinary, musculoskeletal, integument and psychiatric systems are negative, except as otherwise noted.    Objective    /64 (BP Location: Right arm, Patient Position: Sitting, Cuff Size: Adult Regular)   Pulse 83   Temp 98  F (36.7  C) (Oral)   Resp 16   Ht 1.515 m (4' 11.65\")   Wt 51.5 kg (113 lb 8 oz)   LMP 11/08/2024 (Approximate)   SpO2 100%   BMI 22.43 kg/m     Estimated body mass index is 22.43 kg/m  as calculated from the " "following:    Height as of this encounter: 1.515 m (4' 11.65\").    Weight as of this encounter: 51.5 kg (113 lb 8 oz).  Physical Exam  GENERAL: alert and no distress  EYES: Eyes grossly normal to inspection, PERRL and conjunctivae and sclerae normal  HENT: normal cephalic/atraumatic, both ears: occluded with wax, nose and mouth without ulcers or lesions, oropharynx clear, and oral mucous membranes moist  NECK: no adenopathy, no asymmetry, masses, or scars  RESP: lungs clear to auscultation - no rales, rhonchi or wheezes  CV: regular rate and rhythm, normal S1 S2, no S3 or S4, no murmur, click or rub, no peripheral edema  ABDOMEN: soft, nontender, no hepatosplenomegaly, no masses and bowel sounds normal  MS: no gross musculoskeletal defects noted, no edema  SKIN: no suspicious lesions or rashes  NEURO: Normal strength and tone, mentation intact and speech normal  PSYCH: mentation appears normal, affect normal/bright    No results for input(s): \"HGB\", \"PLT\", \"INR\", \"NA\", \"POTASSIUM\", \"CR\", \"A1C\" in the last 8760 hours.     Diagnostics  No labs were ordered during this visit.   No EKG required, no history of coronary heart disease, significant arrhythmia, peripheral arterial disease or other structural heart disease.    Revised Cardiac Risk Index (RCRI)  The patient has the following serious cardiovascular risks for perioperative complications:   - No serious cardiac risks = 0 points     RCRI Interpretation: 0 points: Class I (very low risk - 0.4% complication rate)         Signed Electronically by: VANGIE DIAZ DO  A copy of this evaluation report is provided to the requesting physician.         "

## 2024-12-10 ENCOUNTER — ANESTHESIA EVENT (OUTPATIENT)
Dept: SURGERY | Facility: AMBULATORY SURGERY CENTER | Age: 29
End: 2024-12-10
Payer: COMMERCIAL

## 2024-12-11 ENCOUNTER — ANESTHESIA (OUTPATIENT)
Dept: SURGERY | Facility: AMBULATORY SURGERY CENTER | Age: 29
End: 2024-12-11
Payer: COMMERCIAL

## 2024-12-11 ENCOUNTER — HOSPITAL ENCOUNTER (OUTPATIENT)
Facility: AMBULATORY SURGERY CENTER | Age: 29
Discharge: HOME | End: 2024-12-11
Attending: OBSTETRICS & GYNECOLOGY
Payer: COMMERCIAL

## 2024-12-11 VITALS
OXYGEN SATURATION: 99 % | HEART RATE: 87 BPM | HEIGHT: 60 IN | TEMPERATURE: 96.8 F | SYSTOLIC BLOOD PRESSURE: 111 MMHG | DIASTOLIC BLOOD PRESSURE: 76 MMHG | BODY MASS INDEX: 22.29 KG/M2 | WEIGHT: 113.54 LBS | RESPIRATION RATE: 16 BRPM

## 2024-12-11 DIAGNOSIS — Z30.09 UNWANTED FERTILITY: ICD-10-CM

## 2024-12-11 DIAGNOSIS — G89.18 POST-OP PAIN: Primary | ICD-10-CM

## 2024-12-11 LAB
HCG UR QL: NEGATIVE
INTERNAL QC OK POCT: NORMAL
POCT KIT EXPIRATION DATE: NORMAL
POCT KIT LOT NUMBER: NORMAL

## 2024-12-11 RX ORDER — DEXAMETHASONE SODIUM PHOSPHATE 4 MG/ML
4 INJECTION, SOLUTION INTRA-ARTICULAR; INTRALESIONAL; INTRAMUSCULAR; INTRAVENOUS; SOFT TISSUE
Status: DISCONTINUED | OUTPATIENT
Start: 2024-12-11 | End: 2024-12-12 | Stop reason: HOSPADM

## 2024-12-11 RX ORDER — FENTANYL CITRATE 50 UG/ML
INJECTION, SOLUTION INTRAMUSCULAR; INTRAVENOUS PRN
Status: DISCONTINUED | OUTPATIENT
Start: 2024-12-11 | End: 2024-12-11

## 2024-12-11 RX ORDER — LIDOCAINE 40 MG/G
CREAM TOPICAL
Status: DISCONTINUED | OUTPATIENT
Start: 2024-12-11 | End: 2024-12-12 | Stop reason: HOSPADM

## 2024-12-11 RX ORDER — ONDANSETRON 2 MG/ML
4 INJECTION INTRAMUSCULAR; INTRAVENOUS EVERY 30 MIN PRN
Status: DISCONTINUED | OUTPATIENT
Start: 2024-12-11 | End: 2024-12-12 | Stop reason: HOSPADM

## 2024-12-11 RX ORDER — KETOROLAC TROMETHAMINE 30 MG/ML
INJECTION, SOLUTION INTRAMUSCULAR; INTRAVENOUS PRN
Status: DISCONTINUED | OUTPATIENT
Start: 2024-12-11 | End: 2024-12-11

## 2024-12-11 RX ORDER — ONDANSETRON 4 MG/1
4 TABLET, ORALLY DISINTEGRATING ORAL EVERY 30 MIN PRN
Status: DISCONTINUED | OUTPATIENT
Start: 2024-12-11 | End: 2024-12-12 | Stop reason: HOSPADM

## 2024-12-11 RX ORDER — ONDANSETRON 2 MG/ML
INJECTION INTRAMUSCULAR; INTRAVENOUS PRN
Status: DISCONTINUED | OUTPATIENT
Start: 2024-12-11 | End: 2024-12-11

## 2024-12-11 RX ORDER — IBUPROFEN 100 MG/5ML
5 SUSPENSION ORAL EVERY 6 HOURS PRN
Qty: 150 ML | Refills: 1 | Status: SHIPPED | OUTPATIENT
Start: 2024-12-11

## 2024-12-11 RX ORDER — FENTANYL CITRATE 0.05 MG/ML
50 INJECTION, SOLUTION INTRAMUSCULAR; INTRAVENOUS EVERY 5 MIN PRN
Status: DISCONTINUED | OUTPATIENT
Start: 2024-12-11 | End: 2024-12-12 | Stop reason: HOSPADM

## 2024-12-11 RX ORDER — DEXAMETHASONE SODIUM PHOSPHATE 10 MG/ML
INJECTION, SOLUTION INTRAMUSCULAR; INTRAVENOUS PRN
Status: DISCONTINUED | OUTPATIENT
Start: 2024-12-11 | End: 2024-12-11

## 2024-12-11 RX ORDER — OXYCODONE HYDROCHLORIDE 10 MG/1
10 TABLET ORAL
Status: DISCONTINUED | OUTPATIENT
Start: 2024-12-11 | End: 2024-12-12 | Stop reason: HOSPADM

## 2024-12-11 RX ORDER — PROPOFOL 10 MG/ML
INJECTION, EMULSION INTRAVENOUS CONTINUOUS PRN
Status: DISCONTINUED | OUTPATIENT
Start: 2024-12-11 | End: 2024-12-11

## 2024-12-11 RX ORDER — IBUPROFEN 100 MG/5ML
5 SUSPENSION ORAL ONCE
OUTPATIENT
Start: 2024-12-11

## 2024-12-11 RX ORDER — ACETAMINOPHEN 325 MG/1
975 TABLET ORAL ONCE
Status: COMPLETED | OUTPATIENT
Start: 2024-12-11 | End: 2024-12-11

## 2024-12-11 RX ORDER — GLYCOPYRROLATE 0.2 MG/ML
INJECTION, SOLUTION INTRAMUSCULAR; INTRAVENOUS PRN
Status: DISCONTINUED | OUTPATIENT
Start: 2024-12-11 | End: 2024-12-11

## 2024-12-11 RX ORDER — HYDROMORPHONE HCL IN WATER/PF 6 MG/30 ML
0.4 PATIENT CONTROLLED ANALGESIA SYRINGE INTRAVENOUS EVERY 5 MIN PRN
Status: DISCONTINUED | OUTPATIENT
Start: 2024-12-11 | End: 2024-12-12 | Stop reason: HOSPADM

## 2024-12-11 RX ORDER — LIDOCAINE HYDROCHLORIDE 20 MG/ML
INJECTION, SOLUTION INFILTRATION; PERINEURAL PRN
Status: DISCONTINUED | OUTPATIENT
Start: 2024-12-11 | End: 2024-12-11

## 2024-12-11 RX ORDER — NALOXONE HYDROCHLORIDE 0.4 MG/ML
0.1 INJECTION, SOLUTION INTRAMUSCULAR; INTRAVENOUS; SUBCUTANEOUS
Status: DISCONTINUED | OUTPATIENT
Start: 2024-12-11 | End: 2024-12-12 | Stop reason: HOSPADM

## 2024-12-11 RX ORDER — OXYCODONE HYDROCHLORIDE 5 MG/1
5 TABLET ORAL
Status: DISCONTINUED | OUTPATIENT
Start: 2024-12-11 | End: 2024-12-12 | Stop reason: HOSPADM

## 2024-12-11 RX ORDER — SODIUM CHLORIDE, SODIUM LACTATE, POTASSIUM CHLORIDE, CALCIUM CHLORIDE 600; 310; 30; 20 MG/100ML; MG/100ML; MG/100ML; MG/100ML
INJECTION, SOLUTION INTRAVENOUS CONTINUOUS
Status: DISCONTINUED | OUTPATIENT
Start: 2024-12-11 | End: 2024-12-12 | Stop reason: HOSPADM

## 2024-12-11 RX ORDER — PROPOFOL 10 MG/ML
INJECTION, EMULSION INTRAVENOUS PRN
Status: DISCONTINUED | OUTPATIENT
Start: 2024-12-11 | End: 2024-12-11

## 2024-12-11 RX ORDER — FENTANYL CITRATE 0.05 MG/ML
25 INJECTION, SOLUTION INTRAMUSCULAR; INTRAVENOUS EVERY 5 MIN PRN
Status: DISCONTINUED | OUTPATIENT
Start: 2024-12-11 | End: 2024-12-12 | Stop reason: HOSPADM

## 2024-12-11 RX ORDER — HYDROMORPHONE HCL IN WATER/PF 6 MG/30 ML
0.2 PATIENT CONTROLLED ANALGESIA SYRINGE INTRAVENOUS EVERY 5 MIN PRN
Status: DISCONTINUED | OUTPATIENT
Start: 2024-12-11 | End: 2024-12-12 | Stop reason: HOSPADM

## 2024-12-11 RX ORDER — SODIUM CHLORIDE, SODIUM LACTATE, POTASSIUM CHLORIDE, CALCIUM CHLORIDE 600; 310; 30; 20 MG/100ML; MG/100ML; MG/100ML; MG/100ML
INJECTION, SOLUTION INTRAVENOUS CONTINUOUS PRN
Status: DISCONTINUED | OUTPATIENT
Start: 2024-12-11 | End: 2024-12-11

## 2024-12-11 RX ADMIN — FENTANYL CITRATE 100 MCG: 50 INJECTION, SOLUTION INTRAMUSCULAR; INTRAVENOUS at 07:15

## 2024-12-11 RX ADMIN — PROPOFOL 150 MG: 10 INJECTION, EMULSION INTRAVENOUS at 07:15

## 2024-12-11 RX ADMIN — SODIUM CHLORIDE, SODIUM LACTATE, POTASSIUM CHLORIDE, CALCIUM CHLORIDE: 600; 310; 30; 20 INJECTION, SOLUTION INTRAVENOUS at 07:04

## 2024-12-11 RX ADMIN — Medication 30 MG: at 07:15

## 2024-12-11 RX ADMIN — SODIUM CHLORIDE, SODIUM LACTATE, POTASSIUM CHLORIDE, CALCIUM CHLORIDE: 600; 310; 30; 20 INJECTION, SOLUTION INTRAVENOUS at 07:06

## 2024-12-11 RX ADMIN — DEXAMETHASONE SODIUM PHOSPHATE 8 MG: 10 INJECTION, SOLUTION INTRAMUSCULAR; INTRAVENOUS at 07:26

## 2024-12-11 RX ADMIN — PROPOFOL 160 MCG/KG/MIN: 10 INJECTION, EMULSION INTRAVENOUS at 07:15

## 2024-12-11 RX ADMIN — GLYCOPYRROLATE 0.2 MG: 0.2 INJECTION, SOLUTION INTRAMUSCULAR; INTRAVENOUS at 07:15

## 2024-12-11 RX ADMIN — PROPOFOL 50 MG: 10 INJECTION, EMULSION INTRAVENOUS at 07:39

## 2024-12-11 RX ADMIN — LIDOCAINE HYDROCHLORIDE 3 ML: 20 INJECTION, SOLUTION INFILTRATION; PERINEURAL at 07:15

## 2024-12-11 RX ADMIN — KETOROLAC TROMETHAMINE 15 MG: 30 INJECTION, SOLUTION INTRAMUSCULAR; INTRAVENOUS at 07:49

## 2024-12-11 RX ADMIN — ONDANSETRON 4 MG: 2 INJECTION INTRAMUSCULAR; INTRAVENOUS at 07:41

## 2024-12-11 NOTE — OP NOTE
DATE OF SERVICE: 12/11/2024    PREOPERATIVE DIAGNOSIS: unwanted fertility    POSTOPERATIVE DIAGNOSIS: same    PROCEDURE: diagnostic laparoscopy, bilateral salpingectomy     SURGEON:  Ciara Iraheta     FINDINGS: normal pelvis    ANESTHESIA: general endotracheal    ESTIMATED BLOOD LOSS: 3 cc    SPECIMENS: bilateral tubes    DRAINS: none    COMPLICATIONS: none    Brief History:  The patient is a 30 yo P0 who knows she doesn't want any pregnancies.  She has a developmental delay and was seen with her mother in the outpatient setting to discuss fertility options.  She was counseled on the permanence of the procedure, the approximately 1/200 failure rate, and the increased risk for ectopic should pregnancy occur.  She was counseled on the risks, benefits and alternatives prior to surgery.    Procedure:  The patient was brought to the operating room where general anesthesia was obtained.  She was placed in dorsal lithotomy position.  Exam under anesthesia revealed an intact hymen, an anteverted uterus with normal adnexa bilaterally.  She was prepped and draped in the usual sterile fashion.  Time out was done confirming the patient and the procedure.  Saenz catheter was inserted.  Gloves were changed and attention was directed to the abdomen.    Scalpel was used to make a small incision just below the umbilicus. Verees needles was inserted in the open position, there was a normal saline drip test and carbon dioxide insufflation was begun.  After adequate insufflation was obtained, the Verees was removed and a 5 mm bladeless trocar was inserted with confirmation of entry into the peritoneal cavity.  Two additional 5 mm trocars were placed, one 2 cm above the pubic symphasis in the midline and one in the right lower quadrant, both under direct visualization with good hemostasis noted.  The left tube was identified to its fimbriated end.  Ligasure cautery was used to excise the tube.  Similar procedure was carried out on the  right fallopian tube after identifying it to the fimbriated end.  Good hemostasis was noted.  The upper abdomen appeared normal.  The accessory trocars were removed under direct visualization with good hemostasis noted.  Carbon dioxide was released from the patient's abdomen.  Main trocar was then removed.  Skin was closed with subcuticular stitches of 4-0 Vicryl.   Saenz was removed.  She was returned to supine position and brought to the recovery room in stable condition.  Sponge and sharp counts were correct.  She is expected to go home on the day of the procedure.

## 2024-12-11 NOTE — ANESTHESIA PROCEDURE NOTES
Airway       Patient location during procedure: OR       Procedure Start/Stop Times: 12/11/2024 7:18 AM  Staff -        CRNA: Annette Keen APRN CRNA       Performed By: CRNA  Consent for Airway        Urgency: emergent  Indications and Patient Condition       Indications for airway management: lizzie-procedural         Mask difficulty assessment: 1 - vent by mask    Final Airway Details       Final airway type: endotracheal airway       Successful airway: ETT - single  Endotracheal Airway Details        ETT size (mm): 6.5       Cuffed: yes       Successful intubation technique: direct laryngoscopy       DL Blade Type: MAC 3       Grade View of Cords: 1       Adjucts: stylet       Position: Right       Measured from: lips       Secured at (cm): 22       Bite block used: None    Post intubation assessment        Placement verified by: capnometry, equal breath sounds and chest rise        Number of attempts at approach: 1       Number of other approaches attempted: 0       Secured with: commercial tube ruiz and tape       Ease of procedure: easy       Dentition: Intact and Unchanged       Dental guard used and removed. Dental Guard Type: Proguard Red.    Medication(s) Administered   Medication Administration Time: 12/11/2024 7:18 AM

## 2024-12-11 NOTE — ANESTHESIA PREPROCEDURE EVALUATION
Anesthesia Pre-Procedure Evaluation    Patient: Araceli Go   MRN: [id number] : 1995        Procedure : Procedure(s):  SALPINGECTOMY, LAPAROSCOPIC          History reviewed. No pertinent past medical history.   Past Surgical History:   Procedure Laterality Date     EYE SURGERY        No Known Allergies   Social History     Tobacco Use     Smoking status: Never     Passive exposure: Never     Smokeless tobacco: Never   Substance Use Topics     Alcohol use: Yes     Comment: one drink every three months or so      Wt Readings from Last 1 Encounters:   12/10/24 51.5 kg (113 lb 8.6 oz)        Anesthesia Evaluation            ROS/MED HX  ENT/Pulmonary:  - neg pulmonary ROS     Neurologic:     (+)                         Developmental delay,       Cardiovascular:  - neg cardiovascular ROS     METS/Exercise Tolerance:     Hematologic:  - neg hematologic  ROS     Musculoskeletal:  - neg musculoskeletal ROS     GI/Hepatic:  - neg GI/hepatic ROS     Renal/Genitourinary:  - neg Renal ROS     Endo:  - neg endo ROS     Psychiatric/Substance Use:  - neg psychiatric ROS     Infectious Disease:  - neg infectious disease ROS     Malignancy:  - neg malignancy ROS     Other:  - neg other ROS        Physical Exam    Airway  airway exam normal      Mallampati: II       Respiratory Devices and Support         Dental  no notable dental history         Cardiovascular   cardiovascular exam normal       Rhythm and rate: regular and normal     Pulmonary   pulmonary exam normal        breath sounds clear to auscultation       OUTSIDE LABS:  CBC:   Lab Results   Component Value Date    WBC 4.7 2022    WBC 4.7 2021    HGB 13.0 2022    HGB 13.1 2021    HCT 40.8 2022    HCT 40.5 2021     2022     2021     BMP:   Lab Results   Component Value Date     2021     2020    POTASSIUM 4.2 2021    POTASSIUM 4.8 2020    CHLORIDE 104 2021     "CHLORIDE 103 01/06/2020    CO2 25 01/11/2021    CO2 20 (L) 01/06/2020    BUN 10 01/11/2021    BUN 13 01/06/2020    CR 0.70 01/11/2021    CR 0.79 01/06/2020    GLC 78 01/14/2022    GLC 85 01/11/2021     COAGS: No results found for: \"PTT\", \"INR\", \"FIBR\"  POC:   Lab Results   Component Value Date    HCG Negative 12/11/2024     HEPATIC:   Lab Results   Component Value Date    ALBUMIN 4.4 01/11/2021    PROTTOTAL 7.5 01/11/2021    ALT 15 01/11/2021    AST 18 01/11/2021    ALKPHOS 65 01/11/2021    BILITOTAL 0.2 01/11/2021     OTHER:   Lab Results   Component Value Date    A1C 4.7 01/06/2020    KAMARI 9.8 01/11/2021    TSH 1.76 01/14/2022       Anesthesia Plan    ASA Status:  2       Anesthesia Type: General.     - Airway: ETT   Induction: Intravenous.   Maintenance: Balanced.        Consents    Anesthesia Plan(s) and associated risks, benefits, and realistic alternatives discussed. Questions answered and patient/representative(s) expressed understanding.     - Discussed:     - Discussed with:  Patient      - Extended Intubation/Ventilatory Support Discussed: No.      - Patient is DNR/DNI Status: No     Use of blood products discussed: No .     Postoperative Care    Pain management: IV analgesics, Oral pain medications.   PONV prophylaxis: Ondansetron (or other 5HT-3), Dexamethasone or Solumedrol     Comments:             Zaki Morelos MD    I have reviewed the pertinent notes and labs in the chart from the past 30 days and (re)examined the patient.  Any updates or changes from those notes are reflected in this note.                                   "

## 2024-12-11 NOTE — ANESTHESIA CARE TRANSFER NOTE
Patient: Araceli Go    Procedure: Procedure(s):  SALPINGECTOMY, LAPAROSCOPIC       Diagnosis: Unwanted fertility [Z30.09]  Diagnosis Additional Information: No value filed.    Anesthesia Type:   General     Note:    Oropharynx: oropharynx clear of all foreign objects and spontaneously breathing  Level of Consciousness: drowsy  Oxygen Supplementation: room air    Independent Airway: airway patency satisfactory and stable  Dentition: dentition unchanged  Vital Signs Stable: post-procedure vital signs reviewed and stable  Report to RN Given: handoff report given  Patient transferred to: PACU    Handoff Report: Identifed the Patient, Identified the Reponsible Provider, Reviewed the pertinent medical history, Discussed the surgical course, Reviewed Intra-OP anesthesia mangement and issues during anesthesia, Set expectations for post-procedure period and Allowed opportunity for questions and acknowledgement of understanding      Vitals:  Vitals Value Taken Time   BP 82/59 12/11/24 0809   Temp 96.8  F (36  C) 12/11/24 0805   Pulse 103 12/11/24 0809   Resp 14 12/11/24 0805   SpO2 100 % 12/11/24 0809   Vitals shown include unfiled device data.    Electronically Signed By: KUN Dillrad CRNA  December 11, 2024  8:12 AM

## 2024-12-11 NOTE — ANESTHESIA POSTPROCEDURE EVALUATION
Patient: Araceli Go    Procedure: Procedure(s):  SALPINGECTOMY, LAPAROSCOPIC       Anesthesia Type:  General    Note:  Disposition: Outpatient   Postop Pain Control: Uneventful            Sign Out: Well controlled pain   PONV: No   Neuro/Psych: Uneventful            Sign Out: Acceptable/Baseline neuro status   Airway/Respiratory: Uneventful            Sign Out: Acceptable/Baseline resp. status   CV/Hemodynamics: Uneventful            Sign Out: Acceptable CV status; No obvious hypovolemia; No obvious fluid overload   Other NRE:    DID A NON-ROUTINE EVENT OCCUR?        Last vitals:  Vitals Value Taken Time   /58 12/11/24 0820   Temp 96.8  F (36  C) 12/11/24 0805   Pulse 90 12/11/24 0821   Resp 14 12/11/24 0805   SpO2 99 % 12/11/24 0821   Vitals shown include unfiled device data.    Electronically Signed By: Zaki Morelos MD  December 11, 2024  10:42 AM

## 2024-12-11 NOTE — DISCHARGE INSTRUCTIONS
If you have any questions or concerns regarding your procedure please contact Dr. Iraheta, her office number is [phone]      You received a medication called Toradol (a stronger IV ibuprofen) at 7:50am. Do NOT take any Ibuprofen / Advil / Aleve / Naproxen or products containing Ibuprofen until 1:50pm or later.        [facility]   Adult Discharge Orders & Instructions     For 24 hours after surgery    Get plenty of rest.  A responsible adult must stay with you for at least 24 hours after you leave the hospital.   Do not drive or use heavy equipment.  If you have weakness or tingling, don't drive or use heavy equipment until this feeling goes away.  Do not drink alcohol.  Avoid strenuous or risky activities.  Ask for help when climbing stairs.   You may feel lightheaded.  IF so, sit for a few minutes before standing.  Have someone help you get up.   If you have nausea (feel sick to your stomach): Drink only clear liquids such as apple juice, ginger ale, broth or 7-Up.  Rest may also help.  Be sure to drink enough fluids.  Move to a regular diet as you feel able.  You may have a slight fever. Call the doctor if your fever is over 100 F (37.7 C) (taken under the tongue) or lasts longer than 24 hours.  You may have a dry mouth, a sore throat, muscle aches or trouble sleeping.  These should go away after 24 hours.  Do not make important or legal decisions.   Call your doctor for any of the followin.  Signs of infection (fever, growing tenderness at the surgery site, a large amount of drainage or bleeding, severe pain, foul-smelling drainage, redness, swelling).    2. It has been over 8 to 10 hours since surgery and you are still not able to urinate (pass water).    3.  Headache for over 24 hours.

## 2024-12-19 ENCOUNTER — OFFICE VISIT (OUTPATIENT)
Dept: OBGYN | Facility: CLINIC | Age: 29
End: 2024-12-19
Payer: COMMERCIAL

## 2024-12-19 VITALS — SYSTOLIC BLOOD PRESSURE: 102 MMHG | DIASTOLIC BLOOD PRESSURE: 64 MMHG

## 2024-12-19 DIAGNOSIS — Z98.890 POST-OPERATIVE STATE: Primary | ICD-10-CM

## 2024-12-19 NOTE — PROGRESS NOTES
S:  Pt is here for a post-op visit following bilateral salpingectomy.  Her surgery was on 12/11/24.  She has been feeling well overall.  Her complaints are none.  Pain is gone.    O:  /64 (BP Location: Right arm, Patient Position: Sitting, Cuff Size: Adult Regular)   LMP 12/09/2024  There is no height or weight on file to calculate BMI.    Abdomen: incisions well healed x3 with some bruising at the umbilicus and right hip incisions    A:  Normal post-op check    P:  Surgical findings and pictures discussed with patient.  Questions answered.  Activity and restrictions discussed with pt.  Follow up prn.

## 2025-05-19 ENCOUNTER — OFFICE VISIT (OUTPATIENT)
Dept: FAMILY MEDICINE | Facility: CLINIC | Age: 30
End: 2025-05-19
Payer: COMMERCIAL

## 2025-05-19 VITALS
TEMPERATURE: 98.1 F | DIASTOLIC BLOOD PRESSURE: 71 MMHG | BODY MASS INDEX: 21.66 KG/M2 | RESPIRATION RATE: 20 BRPM | OXYGEN SATURATION: 100 % | HEART RATE: 77 BPM | HEIGHT: 61 IN | WEIGHT: 114.7 LBS | SYSTOLIC BLOOD PRESSURE: 109 MMHG

## 2025-05-19 DIAGNOSIS — Z00.00 ROUTINE GENERAL MEDICAL EXAMINATION AT A HEALTH CARE FACILITY: Primary | ICD-10-CM

## 2025-05-19 DIAGNOSIS — L98.9 SKIN LESION: ICD-10-CM

## 2025-05-19 PROCEDURE — 99395 PREV VISIT EST AGE 18-39: CPT | Performed by: FAMILY MEDICINE

## 2025-05-19 PROCEDURE — 3078F DIAST BP <80 MM HG: CPT | Performed by: FAMILY MEDICINE

## 2025-05-19 PROCEDURE — 3074F SYST BP LT 130 MM HG: CPT | Performed by: FAMILY MEDICINE

## 2025-05-19 NOTE — PROGRESS NOTES
Preventive Care Visit  [facility]  VANGIE DIAZ DO, Family Medicine  May 19, 2025      Assessment & Plan     Routine general medical examination at a health care facility  Patient is well appearing. Not sexually active.     Skin lesion  Chronic, asymptomatic. Discovered incidentally on exam. Concerning features of irregular border, multiple areas of pigmentation, hypopigmentation.   - Adult Dermatology  Referral              Follow-up    Follow-up Visit   Expected date:  May 19, 2026 (Approximate)      Follow Up Appointment Details:     Follow-up with whom?: PCP    Follow-Up for what?: Adult Preventive    How?: In Person                 Subjective   Danilo is a 29 year old, presenting for the following:  Physical        5/19/2025    12:48 PM   Additional Questions   Roomed by KARSTEN Mane   Accompanied by Mom and sister          HPI       No concerns     Advance Care Planning    Discussed advance care planning with patient; informed AVS has link to [facility] [employer].        5/19/2025   General Health   How would you rate your overall physical health? Good         5/19/2025   Nutrition   Three or more servings of calcium each day? Yes   Diet: Regular (no restrictions)   How many servings of fruit and vegetables per day? (!) 2-3   How many sweetened beverages each day? 0-1         5/19/2025   Exercise   Days per week of moderate/strenous exercise 3 days   Average minutes spent exercising at this level 30 min         5/19/2025   Social Factors   Frequency of gathering with friends or relatives Three times a week         5/19/2025   Dental   Dentist two times every year? Yes           Today's PHQ-2 Score:       2/19/2024    10:11 AM   PHQ-2 ( 1999 [employer])   Q1: Little interest or pleasure in doing things 0   Q2: Feeling down, depressed or hopeless 0   PHQ-2 Score 0   Q1: Little interest or pleasure in doing things Not at all   Q2: Feeling down, depressed or hopeless Not at all  "  PHQ-2 Score 0         2/19/2024   Substance Use   Alcohol more than 3/day or more than 7/wk No   Do you use any other substances recreationally? No     Social History     Tobacco Use    Smoking status: Never     Passive exposure: Never    Smokeless tobacco: Never   Substance Use Topics    Alcohol use: Yes     Comment: one drink every three months or so    Drug use: No          Mammogram Screening - Patient under 40 years of age: Routine Mammogram Screening not recommended.       History of abnormal Pap smear: No - age 21-29 PAP every 3 years recommended        2/19/2024    10:40 AM   PAP / HPV   PAP Negative for Intraepithelial Lesion or Malignancy (NILM)            2/19/2024   Contraception/Family Planning   Questions about contraception or family planning No        Reviewed and updated as needed this visit by Provider   Tobacco  Allergies  Meds  Problems  Med Hx  Surg Hx  Fam Hx  Soc   Hx Sexual Activity          History reviewed. No pertinent past medical history.  Past Surgical History:   Procedure Laterality Date    EYE SURGERY      SALPINGECTOMY, LAPAROSCOPIC Bilateral 12/11/2024    Procedure: SALPINGECTOMY, LAPAROSCOPIC;  Surgeon: Ciara Iraheta MD;  Location: [facility]         Review of Systems  Constitutional, HEENT, cardiovascular, pulmonary, gi and gu systems are negative, except as otherwise noted.     Objective    Exam  /71   Pulse 77   Temp 98.1  F (36.7  C) (Oral)   Resp 20   Ht 1.537 m (5' 0.5\")   Wt 52 kg (114 lb 11.2 oz)   LMP 05/01/2025 (Approximate)   SpO2 100%   BMI 22.03 kg/m     Estimated body mass index is 22.03 kg/m  as calculated from the following:    Height as of this encounter: 1.537 m (5' 0.5\").    Weight as of this encounter: 52 kg (114 lb 11.2 oz).    Physical Exam  GENERAL: alert and no distress  EYES: Eyes grossly normal to inspection, PERRL and conjunctivae and sclerae normal  HENT: ear canals and TM's normal, nose and mouth without ulcers or " lesions  NECK: no adenopathy, no asymmetry, masses, or scars  RESP: lungs clear to auscultation - no rales, rhonchi or wheezes  CV: regular rate and rhythm, normal S1 S2, no S3 or S4, no murmur, click or rub, no peripheral edema  ABDOMEN: soft, nontender, no hepatosplenomegaly, no masses and bowel sounds normal  MS: no gross musculoskeletal defects noted, no edema  SKIN: no suspicious lesions or rashes        Signed Electronically by: VANGIE DIAZ,

## 2025-05-19 NOTE — PATIENT INSTRUCTIONS
Patient Education   Preventive Care Advice   This is general advice given by our system to help you stay healthy. However, your care team may have specific advice just for you. Please talk to your care team about your preventive care needs.  Nutrition  Eat 5 or more servings of fruits and vegetables each day.  Try wheat bread, brown rice and whole grain pasta (instead of white bread, rice, and pasta).  Get enough calcium and vitamin D. Check the label on foods and aim for 100% of the RDA (recommended daily allowance).  Lifestyle  Exercise at least 150 minutes each week  (30 minutes a day, 5 days a week).  Do muscle strengthening activities 2 days a week. These help control your weight and prevent disease.  No smoking.  Wear sunscreen to prevent skin cancer.  Have a dental exam and cleaning every 6 months.  Yearly exams  See your health care team every year to talk about:  Any changes in your health.  Any medicines your care team has prescribed.  Preventive care, family planning, and ways to prevent chronic diseases.  Shots (vaccines)   HPV shots (up to age 26), if you've never had them before.  Hepatitis B shots (up to age 59), if you've never had them before.  COVID-19 shot: Get this shot when it's due.  Flu shot: Get a flu shot every year.  Tetanus shot: Get a tetanus shot every 10 years.  Pneumococcal, hepatitis A, and RSV shots: Ask your care team if you need these based on your risk.  Shingles shot (for age 50 and up)  General health tests  Diabetes screening:  Starting at age 35, Get screened for diabetes at least every 3 years.  If you are younger than age 35, ask your care team if you should be screened for diabetes.  Cholesterol test: At age 39, start having a cholesterol test every 5 years, or more often if advised.  Bone density scan (DEXA): At age 50, ask your care team if you should have this scan for osteoporosis (brittle bones).  Hepatitis C: Get tested at least once in your life.  STIs (sexually  transmitted infections)  Before age 24: Ask your care team if you should be screened for STIs.  After age 24: Get screened for STIs if you're at risk. You are at risk for STIs (including HIV) if:  You are sexually active with more than one person.  You don't use condoms every time.  You or a partner was diagnosed with a sexually transmitted infection.  If you are at risk for HIV, ask about PrEP medicine to prevent HIV.  Get tested for HIV at least once in your life, whether you are at risk for HIV or not.  Cancer screening tests  Cervical cancer screening: If you have a cervix, begin getting regular cervical cancer screening tests starting at age 21.  Breast cancer scan (mammogram): If you've ever had breasts, begin having regular mammograms starting at age 40. This is a scan to check for breast cancer.  Colon cancer screening: It is important to start screening for colon cancer at age 45.  Have a colonoscopy test every 10 years (or more often if you're at risk) Or, ask your provider about stool tests like a FIT test every year or Cologuard test every 3 years.  To learn more about your testing options, visit:   .  For help making a decision, visit:   https://bit.ly/yz68458.  Prostate cancer screening test: If you have a prostate, ask your care team if a prostate cancer screening test (PSA) at age 55 is right for you.  Lung cancer screening: If you are a current or former smoker ages 50 to 80, ask your care team if ongoing lung cancer screenings are right for you.  For informational purposes only. Not to replace the advice of your health care provider. Copyright   2023 [employer]. All rights reserved. Clinically reviewed by the [facility] Transitions Program. [employer] [id number] - REV 01/24.

## 2025-05-20 ENCOUNTER — PATIENT OUTREACH (OUTPATIENT)
Dept: CARE COORDINATION | Facility: CLINIC | Age: 30
End: 2025-05-20
Payer: COMMERCIAL